# Patient Record
Sex: FEMALE | Race: WHITE | Employment: OTHER | URBAN - METROPOLITAN AREA
[De-identification: names, ages, dates, MRNs, and addresses within clinical notes are randomized per-mention and may not be internally consistent; named-entity substitution may affect disease eponyms.]

---

## 2017-01-06 ENCOUNTER — OFFICE VISIT (OUTPATIENT)
Dept: FAMILY MEDICINE CLINIC | Facility: CLINIC | Age: 82
End: 2017-01-06

## 2017-01-06 VITALS
WEIGHT: 175 LBS | RESPIRATION RATE: 16 BRPM | SYSTOLIC BLOOD PRESSURE: 111 MMHG | HEART RATE: 61 BPM | HEIGHT: 57.48 IN | BODY MASS INDEX: 37.24 KG/M2 | TEMPERATURE: 98 F | DIASTOLIC BLOOD PRESSURE: 66 MMHG

## 2017-01-06 DIAGNOSIS — I10 ESSENTIAL HYPERTENSION WITH GOAL BLOOD PRESSURE LESS THAN 140/90: Primary | ICD-10-CM

## 2017-01-06 DIAGNOSIS — Z12.31 ENCOUNTER FOR SCREENING MAMMOGRAM FOR BREAST CANCER: ICD-10-CM

## 2017-01-06 PROCEDURE — 99213 OFFICE O/P EST LOW 20 MIN: CPT | Performed by: FAMILY MEDICINE

## 2017-01-06 PROCEDURE — G0463 HOSPITAL OUTPT CLINIC VISIT: HCPCS | Performed by: FAMILY MEDICINE

## 2017-01-06 NOTE — PROGRESS NOTES
HPI:    Patient ID: Zoë Morris is a 80year old female. Hypertension  This is a chronic problem. The current episode started more than 1 year ago. The problem is unchanged. The problem is controlled.  Pertinent negatives include no anxiety, blurred vi Skin: Skin is warm and dry.               ASSESSMENT/PLAN:   Essential hypertension with goal blood pressure less than 140/90  (primary encounter diagnosis)  Encounter for screening mammogram for breast cancer   Anxiety disorder    Hypertension–well contr

## 2017-03-02 ENCOUNTER — TELEPHONE (OUTPATIENT)
Dept: FAMILY MEDICINE CLINIC | Facility: CLINIC | Age: 82
End: 2017-03-02

## 2017-03-23 RX ORDER — MELOXICAM 15 MG/1
TABLET ORAL
Qty: 30 TABLET | Refills: 1 | Status: SHIPPED | OUTPATIENT
Start: 2017-03-23 | End: 2018-10-11 | Stop reason: ALTCHOICE

## 2017-05-07 RX ORDER — TRAMADOL HYDROCHLORIDE 50 MG/1
TABLET ORAL
Qty: 90 TABLET | Refills: 0 | Status: SHIPPED | OUTPATIENT
Start: 2017-05-07 | End: 2018-12-18 | Stop reason: ALTCHOICE

## 2017-07-25 RX ORDER — SIMVASTATIN 20 MG
TABLET ORAL
Qty: 90 TABLET | Refills: 3 | Status: SHIPPED | OUTPATIENT
Start: 2017-07-25 | End: 2018-07-28

## 2017-07-25 RX ORDER — VALSARTAN AND HYDROCHLOROTHIAZIDE 160; 12.5 MG/1; MG/1
TABLET, FILM COATED ORAL
Qty: 90 TABLET | Refills: 3 | Status: SHIPPED | OUTPATIENT
Start: 2017-07-25 | End: 2017-12-16 | Stop reason: ALTCHOICE

## 2017-09-29 ENCOUNTER — OFFICE VISIT (OUTPATIENT)
Dept: FAMILY MEDICINE CLINIC | Facility: CLINIC | Age: 82
End: 2017-09-29

## 2017-09-29 ENCOUNTER — APPOINTMENT (OUTPATIENT)
Dept: LAB | Age: 82
End: 2017-09-29
Attending: FAMILY MEDICINE
Payer: MEDICARE

## 2017-09-29 VITALS
DIASTOLIC BLOOD PRESSURE: 69 MMHG | TEMPERATURE: 98 F | WEIGHT: 173.81 LBS | RESPIRATION RATE: 14 BRPM | BODY MASS INDEX: 36.99 KG/M2 | HEART RATE: 67 BPM | HEIGHT: 57.48 IN | SYSTOLIC BLOOD PRESSURE: 106 MMHG

## 2017-09-29 DIAGNOSIS — E78.2 MIXED HYPERLIPIDEMIA: ICD-10-CM

## 2017-09-29 DIAGNOSIS — I10 HYPERTENSION, BENIGN: ICD-10-CM

## 2017-09-29 DIAGNOSIS — Z00.00 ROUTINE PHYSICAL EXAMINATION: ICD-10-CM

## 2017-09-29 DIAGNOSIS — IMO0001 CLASS 2 OBESITY WITH SERIOUS COMORBIDITY AND BODY MASS INDEX (BMI) OF 37.0 TO 37.9 IN ADULT, UNSPECIFIED OBESITY TYPE: ICD-10-CM

## 2017-09-29 DIAGNOSIS — Z00.00 ENCOUNTER FOR ANNUAL HEALTH EXAMINATION: ICD-10-CM

## 2017-09-29 DIAGNOSIS — R73.03 PREDIABETES: ICD-10-CM

## 2017-09-29 DIAGNOSIS — M85.80 OSTEOPENIA, UNSPECIFIED LOCATION: ICD-10-CM

## 2017-09-29 DIAGNOSIS — Z00.00 ROUTINE PHYSICAL EXAMINATION: Primary | ICD-10-CM

## 2017-09-29 DIAGNOSIS — Z86.010 HISTORY OF COLONIC POLYPS: ICD-10-CM

## 2017-09-29 DIAGNOSIS — I44.7 LEFT BUNDLE BRANCH BLOCK (LBBB): ICD-10-CM

## 2017-09-29 DIAGNOSIS — M15.9 PRIMARY OSTEOARTHRITIS INVOLVING MULTIPLE JOINTS: ICD-10-CM

## 2017-09-29 DIAGNOSIS — H81.09 MENIERE'S DISEASE, UNSPECIFIED LATERALITY: ICD-10-CM

## 2017-09-29 DIAGNOSIS — R73.9 HYPERGLYCEMIA: ICD-10-CM

## 2017-09-29 DIAGNOSIS — E28.39 ESTROGEN DEFICIENCY: ICD-10-CM

## 2017-09-29 PROBLEM — H91.13 PRESBYCUSIS OF BOTH EARS: Status: ACTIVE | Noted: 2017-09-29

## 2017-09-29 PROCEDURE — 82306 VITAMIN D 25 HYDROXY: CPT

## 2017-09-29 PROCEDURE — 80053 COMPREHEN METABOLIC PANEL: CPT

## 2017-09-29 PROCEDURE — 90653 IIV ADJUVANT VACCINE IM: CPT | Performed by: FAMILY MEDICINE

## 2017-09-29 PROCEDURE — 84443 ASSAY THYROID STIM HORMONE: CPT

## 2017-09-29 PROCEDURE — G0009 ADMIN PNEUMOCOCCAL VACCINE: HCPCS | Performed by: FAMILY MEDICINE

## 2017-09-29 PROCEDURE — G0439 PPPS, SUBSEQ VISIT: HCPCS | Performed by: FAMILY MEDICINE

## 2017-09-29 PROCEDURE — 83036 HEMOGLOBIN GLYCOSYLATED A1C: CPT

## 2017-09-29 PROCEDURE — 90670 PCV13 VACCINE IM: CPT | Performed by: FAMILY MEDICINE

## 2017-09-29 PROCEDURE — 80061 LIPID PANEL: CPT

## 2017-09-29 PROCEDURE — G0008 ADMIN INFLUENZA VIRUS VAC: HCPCS | Performed by: FAMILY MEDICINE

## 2017-09-29 NOTE — PROGRESS NOTES
HPI:   Gurwinder Kim is a [de-identified]year old female who presents for a Medicare Subsequent Annual Wellness visit (Pt already had Initial Annual Wellness).     generally well  Her last annual assessment has been over 1 year: Annual Physical due on 04/10/1939 hypertension. She  has no past surgical history on file. Her family history includes Other in her father and mother. SOCIAL HISTORY:   She  reports that she has never smoked.  She has never used smokeless tobacco. She reports that she does not drink some words in a sentence and need to ask people to repeat themselves:  Yes   I especially have trouble understanding the speech of women and children:  Sometimes I have trouble understanding the speaker in a large room such as at a meeting or place of wors normal, no rashes or lesions   Lymph nodes: Cervical, supraclavicular, and axillary nodes normal   Neurologic: Normal         SUGGESTED VACCINATIONS - Influenza, Pneumococcal, Zoster, Tetanus     Immunization History   Administered Date(s) Administered   • Nonfasting labs today.     Estrogen deficiency–check BMD, osteopenia in the past    Hypertension–blood pressure controlled with valsartan/HCT    Mixed hyperlipidemia– continue simvastatin    Ménière's disease–controlled with beta histamine    Nonmorbid obes current health state?: Fair    How do you maintain positive mental well-being?: Social Interaction    If you are a male age 38-65 or a female age 47-67, do you take aspirin?: No    Have you had any immunizations at another office such as Influenza, Hepatit here.  Cognitive Assessment     What day of the week is this?: Correct    What month is it?: Correct    What year is it?: Correct    Recall \"Ball\": Correct    Recall \"Flag\": Correct    Recall \"Tree\": Correct       This section provided for quick revi Immunizations (Update Immunization Activity if applicable)     Influenza  Covered Annually 10/12/2016 Please get every year    Pneumococcal 15 (Prevnar)  Covered Once after 65 09/29/2017 Please get once after your 65th birthday    Pneumococcal 23 (Pneu found.    COPD      Spirometry Testing Annually Spirometry date:  No flowsheet data found.          Template: OMAR LAWRENCE MEDICARE ANNUAL ASSESSMENT FEMALE [09642]

## 2017-09-29 NOTE — PROGRESS NOTES
HPI:    Patient ID: Marcelino Giron is a [de-identified]year old female. HPI    Review of Systems         Current Outpatient Prescriptions:  aspirin 81 MG Oral Tab Take 1 tablet (81 mg total) by mouth daily.  Disp: 90 tablet Rfl: 3   VALSARTAN-HYDROCHLOROTHIAZIDE 160- multiple joints–primarily her knees, shoulders. Continuing with meloxicam and as needed tramadol.   May consider returning for steroid injection knees    History of colonic polyps– 2015, consider repeat colonoscopy 2020    Prediabetes–discussed importance

## 2017-09-29 NOTE — PATIENT INSTRUCTIONS
Kalani Reyes's SCREENING SCHEDULE   Tests on this list are recommended by your physician but may not be covered, or covered at this frequency, by your insurer. Please check with your insurance carrier before scheduling to verify coverage.    PREVENTATIVE Colonoscopy Screen   Covered every 10 years- more often if abnormal There are no preventive care reminders to display for this patient.  Update Health Maintenance if applicable    Flex Sigmoidoscopy Screen  Covered every 5 years No results found for Graeme Kanner year    Pneumococcal 13 (Prevnar)  Covered Once after 65   Orders placed or performed in visit on 09/29/17  -PNEUMOCOCCAL VACC, 13 RENETTA IM    Please get once after your 65th birthday    Pneumococcal 23 (Pneumovax)  Covered Once after 65 No orders found for

## 2017-11-20 ENCOUNTER — TELEPHONE (OUTPATIENT)
Dept: FAMILY MEDICINE CLINIC | Facility: CLINIC | Age: 82
End: 2017-11-20

## 2017-12-16 ENCOUNTER — APPOINTMENT (OUTPATIENT)
Dept: CT IMAGING | Facility: HOSPITAL | Age: 82
DRG: 153 | End: 2017-12-16
Attending: EMERGENCY MEDICINE
Payer: MEDICARE

## 2017-12-16 ENCOUNTER — OFFICE VISIT (OUTPATIENT)
Dept: FAMILY MEDICINE CLINIC | Facility: CLINIC | Age: 82
End: 2017-12-16

## 2017-12-16 ENCOUNTER — NURSE TRIAGE (OUTPATIENT)
Dept: FAMILY MEDICINE CLINIC | Facility: CLINIC | Age: 82
End: 2017-12-16

## 2017-12-16 ENCOUNTER — HOSPITAL ENCOUNTER (INPATIENT)
Facility: HOSPITAL | Age: 82
LOS: 1 days | Discharge: HOME | DRG: 153 | End: 2017-12-17
Attending: EMERGENCY MEDICINE | Admitting: INTERNAL MEDICINE
Payer: MEDICARE

## 2017-12-16 VITALS
HEIGHT: 57.48 IN | BODY MASS INDEX: 36.47 KG/M2 | SYSTOLIC BLOOD PRESSURE: 126 MMHG | TEMPERATURE: 98 F | RESPIRATION RATE: 18 BRPM | DIASTOLIC BLOOD PRESSURE: 73 MMHG | HEART RATE: 94 BPM | WEIGHT: 171.38 LBS

## 2017-12-16 DIAGNOSIS — J36 PERITONSILLAR ABSCESS: Primary | ICD-10-CM

## 2017-12-16 PROCEDURE — 70491 CT SOFT TISSUE NECK W/DYE: CPT | Performed by: EMERGENCY MEDICINE

## 2017-12-16 PROCEDURE — G0463 HOSPITAL OUTPT CLINIC VISIT: HCPCS | Performed by: FAMILY MEDICINE

## 2017-12-16 PROCEDURE — 99223 1ST HOSP IP/OBS HIGH 75: CPT | Performed by: INTERNAL MEDICINE

## 2017-12-16 PROCEDURE — 87880 STREP A ASSAY W/OPTIC: CPT | Performed by: FAMILY MEDICINE

## 2017-12-16 PROCEDURE — 99213 OFFICE O/P EST LOW 20 MIN: CPT | Performed by: FAMILY MEDICINE

## 2017-12-16 RX ORDER — SODIUM CHLORIDE 0.9 % (FLUSH) 0.9 %
3 SYRINGE (ML) INJECTION AS NEEDED
Status: DISCONTINUED | OUTPATIENT
Start: 2017-12-16 | End: 2017-12-17

## 2017-12-16 RX ORDER — HEPARIN SODIUM 5000 [USP'U]/ML
5000 INJECTION, SOLUTION INTRAVENOUS; SUBCUTANEOUS EVERY 8 HOURS SCHEDULED
Status: DISCONTINUED | OUTPATIENT
Start: 2017-12-16 | End: 2017-12-17

## 2017-12-16 RX ORDER — MORPHINE SULFATE 4 MG/ML
4 INJECTION, SOLUTION INTRAMUSCULAR; INTRAVENOUS EVERY 2 HOUR PRN
Status: DISCONTINUED | OUTPATIENT
Start: 2017-12-16 | End: 2017-12-17

## 2017-12-16 RX ORDER — MORPHINE SULFATE 2 MG/ML
1 INJECTION, SOLUTION INTRAMUSCULAR; INTRAVENOUS EVERY 2 HOUR PRN
Status: DISCONTINUED | OUTPATIENT
Start: 2017-12-16 | End: 2017-12-17

## 2017-12-16 RX ORDER — IBUPROFEN 400 MG/1
400 TABLET ORAL EVERY 4 HOURS PRN
Status: DISCONTINUED | OUTPATIENT
Start: 2017-12-16 | End: 2017-12-17

## 2017-12-16 RX ORDER — DEXAMETHASONE SODIUM PHOSPHATE 4 MG/ML
6 VIAL (ML) INJECTION ONCE
Status: COMPLETED | OUTPATIENT
Start: 2017-12-16 | End: 2017-12-16

## 2017-12-16 RX ORDER — IBUPROFEN 200 MG
200 TABLET ORAL EVERY 4 HOURS PRN
Status: DISCONTINUED | OUTPATIENT
Start: 2017-12-16 | End: 2017-12-17

## 2017-12-16 RX ORDER — VALSARTAN 320 MG/1
160 TABLET ORAL DAILY
Status: DISCONTINUED | OUTPATIENT
Start: 2017-12-16 | End: 2017-12-17

## 2017-12-16 RX ORDER — ONDANSETRON 2 MG/ML
4 INJECTION INTRAMUSCULAR; INTRAVENOUS EVERY 6 HOURS PRN
Status: DISCONTINUED | OUTPATIENT
Start: 2017-12-16 | End: 2017-12-17

## 2017-12-16 RX ORDER — SODIUM CHLORIDE 9 MG/ML
INJECTION, SOLUTION INTRAVENOUS CONTINUOUS
Status: DISCONTINUED | OUTPATIENT
Start: 2017-12-16 | End: 2017-12-17

## 2017-12-16 RX ORDER — MORPHINE SULFATE 2 MG/ML
2 INJECTION, SOLUTION INTRAMUSCULAR; INTRAVENOUS EVERY 2 HOUR PRN
Status: DISCONTINUED | OUTPATIENT
Start: 2017-12-16 | End: 2017-12-17

## 2017-12-16 RX ORDER — VALSARTAN 160 MG/1
160 TABLET ORAL DAILY
COMMUNITY
End: 2018-10-11 | Stop reason: ALTCHOICE

## 2017-12-16 RX ORDER — IBUPROFEN 600 MG/1
600 TABLET ORAL EVERY 4 HOURS PRN
Status: DISCONTINUED | OUTPATIENT
Start: 2017-12-16 | End: 2017-12-17

## 2017-12-16 NOTE — TELEPHONE ENCOUNTER
Spoke with pt and Dr Chichi Jeffries recommendations given. Pt states she can come in today at [phone]. Phone room to assist with opening up [phone] slot on Dr Pierre's schedule.

## 2017-12-16 NOTE — ED PROVIDER NOTES
Patient Seen in: [facility] AND [facility] Emergency Department    History   Patient presents with:  Sore Throat    Stated Complaint: sore throat and left ear pain    HPI    Patient is an 81-year-old female that states about 3 weeks ago she started to have a sor exudate. There is no uvular shift. There seems to be some swelling in front of the left tonsillar arch. Eyes: Conjunctivae and EOM are normal. Pupils are equal, round, and reactive to light. Neck: Neck supple.    Cardiovascular: Normal rate, regular rh Tissue Of Neck(contrast Only) (cpt=70491)    Result Date: 12/16/2017  CONCLUSION:  1.  Well-defined rounded 1.7 x 1.6 x 1.5 cm size peripherally enhancing region in the left parapharyngeal location with moderate surrounding edema and moderate effacement of

## 2017-12-16 NOTE — TELEPHONE ENCOUNTER
Please see if she can take 2 extra strength Tylenol and feel up to coming in at 12:45 PM appointment with me.   If not, I will send an antibiotic

## 2017-12-16 NOTE — H&P
[facility] Patient Status:  Emergency    4/10/1935 MRN [id number]   Location [address] Attending Pawan Minor MD   Hosp Day # 0 PCP Yanet Puri.  Kaylynn Jama MD     Date:   Cholecalciferol (VITAMIN D) 2000 units Oral Tab   No No   Sig: Take 2,000 Int'l Units by mouth daily. MELOXICAM 15 MG Oral Tab   No No   Sig: TAKE 1 TABLET (15 MG TOTAL) BY MOUTH DAILY.    SIMVASTATIN 20 MG Oral Tab   No No   Sig: TAKE 1 TABLET EVERY NI nerves II-XII are grossly intact. Cognition and Speech:  Oriented, speech clear and coherent. Psychiatric:  Cooperative, appropriate mood & affect.       Laboratory Data:     Lab Results  Component Value Date   WBC 11.8 12/16/2017   HGB 14.6 12/16/2017    BONES: Moderately severe cervical spondylosis. Moderate cervical thoracic scoliosis.   OTHER: Normal.  No additional imaging findings.       Dictated by (CST): Marietta Sheth MD on 12/16/2017 at 15:41       Approved by (CST): Marietta Sheth MD on 12/16/2

## 2017-12-16 NOTE — PROGRESS NOTES
HPI:    Patient ID: Ralph Hoff is a [de-identified]year old female. Sore Throat    This is a new problem. The current episode started 1 to 4 weeks ago. The problem has been rapidly worsening. The pain is worse on the left side. The pain is severe.  Associated sym Skin: Skin is warm and dry. ASSESSMENT/PLAN:   Peritonsillar abscess  (primary encounter diagnosis)     Patient with sore throat 1 week ago started to improve but then the past 3 days increasing pain and change in voice.   On exam left soft p

## 2017-12-17 VITALS
RESPIRATION RATE: 18 BRPM | HEIGHT: 60 IN | DIASTOLIC BLOOD PRESSURE: 53 MMHG | TEMPERATURE: 98 F | SYSTOLIC BLOOD PRESSURE: 127 MMHG | BODY MASS INDEX: 33.77 KG/M2 | OXYGEN SATURATION: 96 % | WEIGHT: 172 LBS | HEART RATE: 71 BPM

## 2017-12-17 PROCEDURE — 99239 HOSP IP/OBS DSCHRG MGMT >30: CPT | Performed by: HOSPITALIST

## 2017-12-17 RX ORDER — AMOXICILLIN AND CLAVULANATE POTASSIUM 875; 125 MG/1; MG/1
1 TABLET, FILM COATED ORAL 2 TIMES DAILY
Qty: 20 TABLET | Refills: 0 | Status: SHIPPED | OUTPATIENT
Start: 2017-12-17 | End: 2017-12-27

## 2017-12-17 RX ORDER — DEXAMETHASONE 4 MG/1
TABLET ORAL
Qty: 3 TABLET | Refills: 0 | Status: SHIPPED | OUTPATIENT
Start: 2017-12-17 | End: 2018-10-11 | Stop reason: ALTCHOICE

## 2017-12-17 NOTE — PROGRESS NOTES
Pt discharged, son provided transportation home. Discharge paperwork and prescriptions reviewed, pt. verbalized understanding. All questions and concerns addressed. IV access discontinued.

## 2017-12-17 NOTE — PLAN OF CARE
Problem: Patient/Family Goals  Goal: Patient/Family Long Term Goal  Patient's Long Term Goal: return home  Interventions:  - medical intervention  - See additional Care Plan goals for specific interventions    Outcome: Progressing    Goal: Patient/Family S

## 2017-12-17 NOTE — PLAN OF CARE
Problem: Patient/Family Goals  Goal: Patient/Family Long Term Goal  Patient's Long Term Goal: return home  Interventions:  - medical intervention  - See additional Care Plan goals for specific interventions   Outcome: Progressing    Goal: Patient/Family Sh

## 2017-12-17 NOTE — PROGRESS NOTES
[facility] Pharmacy Note:  Renal Adjustment for Unasyn (ampicillin/sulbactam)    Cheng Brewer is a [de-identified]year old female who has been prescribed Unasyn (ampicillin/sulbactam) 3000 mg every 8 hrs.   CrCl is estimated creatinine clearance is 31.6 mL/min (based on SCr

## 2017-12-18 ENCOUNTER — TELEPHONE (OUTPATIENT)
Dept: INTERNAL MEDICINE UNIT | Facility: HOSPITAL | Age: 82
End: 2017-12-18

## 2017-12-18 ENCOUNTER — OFFICE VISIT (OUTPATIENT)
Dept: OTOLARYNGOLOGY | Facility: CLINIC | Age: 82
End: 2017-12-18

## 2017-12-18 VITALS
SYSTOLIC BLOOD PRESSURE: 112 MMHG | WEIGHT: 170 LBS | TEMPERATURE: 97 F | HEIGHT: 59 IN | DIASTOLIC BLOOD PRESSURE: 60 MMHG | BODY MASS INDEX: 34.27 KG/M2

## 2017-12-18 DIAGNOSIS — J03.90 TONSILLITIS: Primary | ICD-10-CM

## 2017-12-18 PROCEDURE — G0463 HOSPITAL OUTPT CLINIC VISIT: HCPCS | Performed by: OTOLARYNGOLOGY

## 2017-12-18 PROCEDURE — 99203 OFFICE O/P NEW LOW 30 MIN: CPT | Performed by: OTOLARYNGOLOGY

## 2017-12-18 NOTE — PROGRESS NOTES
Yolanda Esqueda is a [de-identified]year old female. Patient presents with:  Sore Throat: patient reports sore throat for 3 days ent to ER, peritonsillar abcess on 12/16/17      HISTORY OF PRESENT ILLNESS  12/18/2017  Patient prevents for evaluation of sore throats.  Shari Patel depression. Integumentary Negative Frequent skin infections, pigment change and rash. Hema/Lymph Negative Easy bleeding and easy bruising.            PHYSICAL EXAM    /60 (BP Location: Left arm, Patient Position: Sitting, Cuff Size: adult)   Temp Cholecalciferol (VITAMIN D) 2000 units Oral Tab, Take 2,000 Int'l Units by mouth daily. , Disp: 90 tablet, Rfl: 3  •  SIMVASTATIN 20 MG Oral Tab, TAKE 1 TABLET EVERY NIGHT, Disp: 90 tablet, Rfl: 3  •  TRAMADOL HCL 50 MG Oral Tab, TAKE 1 TABLET 3 TIMES A DAY

## 2017-12-18 NOTE — DISCHARGE SUMMARY
[place] [facility]    Discharge Summary    Marija Urena Patient Status:  Inpatient    4/10/1935 MRN [id number]   Location [facility] 5SW/SE Attending No att. providers found   Hosp Day # 1 PCP Jazz Shane MD     Date of Admission: treated as below:    Parapharyngeal abscess  Leukocytosis  CT confirmed 1.6 x 1.7 x 1.5 peripherally enhancing region in the left parapharyngeal location compatible with a left parapharyngeal abscess  ENT consulted  Pt given decadron and IV unasyn.   Pt sx STOP taking these medications    aspirin 81 MG Tabs              Where to Get Your Medications      Please  your prescriptions at the location directed by your doctor or nurse    Bring a paper prescription for each of these medications  · Amoxi

## 2017-12-18 NOTE — TELEPHONE ENCOUNTER
Pt returning phone call. Pt states she saw the Otolaryngologist today and she feels great. Pt states that she \"loves everyone at the clinic for making her feel better\". Pt states that if she has to come in for a follow up, just let her know.  Please advis

## 2018-06-02 ENCOUNTER — NURSE TRIAGE (OUTPATIENT)
Dept: OTHER | Age: 83
End: 2018-06-02

## 2018-06-02 RX ORDER — AMOXICILLIN AND CLAVULANATE POTASSIUM 875; 125 MG/1; MG/1
TABLET, FILM COATED ORAL
Qty: 20 TABLET | Refills: 0 | OUTPATIENT
Start: 2018-06-02

## 2018-06-02 NOTE — TELEPHONE ENCOUNTER
Action Requested: Summary for Provider     []  Critical Lab, Recommendations Needed  [] Need Additional Advice  []   FYI    []   Need Orders  [] Need Medications Sent to Pharmacy  []  Other     SUMMARY: pt with c/o mild sore throat for one week, rates one

## 2018-06-02 NOTE — TELEPHONE ENCOUNTER
Spoke with pt and advised of Dr Cabello Points recommendations. Pt states she will see how she is feeling later and if worse she will go to UC either today or tomorrow. If pt not feeling worse she will keep her OV for 6/4/18.

## 2018-06-04 ENCOUNTER — OFFICE VISIT (OUTPATIENT)
Dept: FAMILY MEDICINE CLINIC | Facility: CLINIC | Age: 83
End: 2018-06-04

## 2018-06-04 VITALS
BODY MASS INDEX: 35.44 KG/M2 | HEART RATE: 62 BPM | SYSTOLIC BLOOD PRESSURE: 136 MMHG | TEMPERATURE: 98 F | HEIGHT: 59 IN | WEIGHT: 175.81 LBS | DIASTOLIC BLOOD PRESSURE: 76 MMHG

## 2018-06-04 DIAGNOSIS — J02.9 PHARYNGITIS, UNSPECIFIED ETIOLOGY: Primary | ICD-10-CM

## 2018-06-04 PROCEDURE — 87880 STREP A ASSAY W/OPTIC: CPT | Performed by: FAMILY MEDICINE

## 2018-06-04 PROCEDURE — 99213 OFFICE O/P EST LOW 20 MIN: CPT | Performed by: FAMILY MEDICINE

## 2018-06-04 PROCEDURE — G0463 HOSPITAL OUTPT CLINIC VISIT: HCPCS | Performed by: FAMILY MEDICINE

## 2018-06-04 RX ORDER — AMOXICILLIN AND CLAVULANATE POTASSIUM 875; 125 MG/1; MG/1
1 TABLET, FILM COATED ORAL 2 TIMES DAILY
Qty: 20 TABLET | Refills: 0 | Status: SHIPPED | OUTPATIENT
Start: 2018-06-04 | End: 2018-06-14

## 2018-06-04 NOTE — PROGRESS NOTES
HPI:    Patient ID: Jesus Alonzo is a 82year old female. See below        Review of Systems         Current Outpatient Prescriptions:  Amoxicillin-Pot Clavulanate 875-125 MG Oral Tab Take 1 tablet by mouth 2 (two) times daily.  Disp: 20 tablet Rfl: 0 hospitalized with peritonsillar abscess abscess. On exam today uvula midline, tonsils normal.  Mild pharyngeal erythema. With history of recent abscess recommend Augmentin as directed pending results of culture.   Reviewed instructions and side effects of

## 2018-07-29 RX ORDER — VALSARTAN AND HYDROCHLOROTHIAZIDE 160; 12.5 MG/1; MG/1
TABLET, FILM COATED ORAL
Qty: 90 TABLET | Refills: 3 | Status: SHIPPED | OUTPATIENT
Start: 2018-07-29 | End: 2019-07-24

## 2018-07-29 RX ORDER — SIMVASTATIN 20 MG
TABLET ORAL
Qty: 90 TABLET | Refills: 3 | Status: SHIPPED | OUTPATIENT
Start: 2018-07-29 | End: 2019-07-24

## 2018-10-11 ENCOUNTER — OFFICE VISIT (OUTPATIENT)
Dept: FAMILY MEDICINE CLINIC | Facility: CLINIC | Age: 83
End: 2018-10-11
Payer: MEDICARE

## 2018-10-11 ENCOUNTER — TELEPHONE (OUTPATIENT)
Dept: FAMILY MEDICINE CLINIC | Facility: CLINIC | Age: 83
End: 2018-10-11

## 2018-10-11 VITALS
HEART RATE: 66 BPM | TEMPERATURE: 98 F | WEIGHT: 170.38 LBS | SYSTOLIC BLOOD PRESSURE: 116 MMHG | DIASTOLIC BLOOD PRESSURE: 80 MMHG | RESPIRATION RATE: 18 BRPM | BODY MASS INDEX: 35.76 KG/M2 | HEIGHT: 58 IN

## 2018-10-11 DIAGNOSIS — F41.1 GENERALIZED ANXIETY DISORDER: ICD-10-CM

## 2018-10-11 DIAGNOSIS — I10 HYPERTENSION, BENIGN: Primary | ICD-10-CM

## 2018-10-11 PROCEDURE — 99213 OFFICE O/P EST LOW 20 MIN: CPT | Performed by: FAMILY MEDICINE

## 2018-10-11 PROCEDURE — G0008 ADMIN INFLUENZA VIRUS VAC: HCPCS | Performed by: FAMILY MEDICINE

## 2018-10-11 PROCEDURE — 90653 IIV ADJUVANT VACCINE IM: CPT | Performed by: FAMILY MEDICINE

## 2018-10-11 PROCEDURE — G0463 HOSPITAL OUTPT CLINIC VISIT: HCPCS | Performed by: FAMILY MEDICINE

## 2018-10-11 NOTE — TELEPHONE ENCOUNTER
Left message on pt's phone machine to call the OPO and schedule her medicare PE with Dr Jas Brooks for sometime in Dec 2018 pt is also going to have fasting bloodwork on that same visit, so please schedule the appt for am, and no eating just water on that day

## 2018-10-11 NOTE — PROGRESS NOTES
HPI:    Patient ID: Marija Urena is a 82year old female. HTN   This is a chronic problem. The current episode started more than 1 year ago. The problem is unchanged. The problem is controlled. Associated symptoms include anxiety.  Pertinent negatives i yesterday, he prescribed Astelin for chronic intermittent congestion. Generalized anxiety disorder–increased stress recently with her son Keira Nunes hospitalized for mental health issues. He is , .   She states he is always had anxiety prob

## 2018-10-15 RX ORDER — ASPIRIN 81 MG/1
TABLET ORAL
Qty: 90 TABLET | Refills: 3 | Status: SHIPPED | OUTPATIENT
Start: 2018-10-15 | End: 2019-12-18

## 2018-12-18 ENCOUNTER — OFFICE VISIT (OUTPATIENT)
Dept: FAMILY MEDICINE CLINIC | Facility: CLINIC | Age: 83
End: 2018-12-18
Payer: MEDICARE

## 2018-12-18 ENCOUNTER — APPOINTMENT (OUTPATIENT)
Dept: LAB | Age: 83
End: 2018-12-18
Attending: FAMILY MEDICINE
Payer: MEDICARE

## 2018-12-18 VITALS
RESPIRATION RATE: 18 BRPM | HEIGHT: 58 IN | HEART RATE: 55 BPM | WEIGHT: 171 LBS | BODY MASS INDEX: 35.89 KG/M2 | SYSTOLIC BLOOD PRESSURE: 134 MMHG | DIASTOLIC BLOOD PRESSURE: 75 MMHG | TEMPERATURE: 98 F

## 2018-12-18 DIAGNOSIS — I10 HYPERTENSION, BENIGN: ICD-10-CM

## 2018-12-18 DIAGNOSIS — M15.9 PRIMARY OSTEOARTHRITIS INVOLVING MULTIPLE JOINTS: ICD-10-CM

## 2018-12-18 DIAGNOSIS — I44.7 LEFT BUNDLE BRANCH BLOCK (LBBB): ICD-10-CM

## 2018-12-18 DIAGNOSIS — E66.01 CLASS 2 SEVERE OBESITY WITH SERIOUS COMORBIDITY AND BODY MASS INDEX (BMI) OF 35.0 TO 35.9 IN ADULT, UNSPECIFIED OBESITY TYPE (HCC): ICD-10-CM

## 2018-12-18 DIAGNOSIS — R73.03 PREDIABETES: ICD-10-CM

## 2018-12-18 DIAGNOSIS — E78.2 MIXED HYPERLIPIDEMIA: ICD-10-CM

## 2018-12-18 DIAGNOSIS — Z00.00 ROUTINE PHYSICAL EXAMINATION: Primary | ICD-10-CM

## 2018-12-18 DIAGNOSIS — Z12.31 ENCOUNTER FOR SCREENING MAMMOGRAM FOR BREAST CANCER: ICD-10-CM

## 2018-12-18 DIAGNOSIS — Z00.00 ENCOUNTER FOR ANNUAL HEALTH EXAMINATION: ICD-10-CM

## 2018-12-18 DIAGNOSIS — M85.80 OSTEOPENIA, UNSPECIFIED LOCATION: ICD-10-CM

## 2018-12-18 DIAGNOSIS — H91.13 PRESBYCUSIS OF BOTH EARS: ICD-10-CM

## 2018-12-18 DIAGNOSIS — H81.09 MENIERE'S DISEASE, UNSPECIFIED LATERALITY: ICD-10-CM

## 2018-12-18 DIAGNOSIS — Z86.010 HISTORY OF COLONIC POLYPS: ICD-10-CM

## 2018-12-18 PROBLEM — J36 PERITONSILLAR ABSCESS: Status: RESOLVED | Noted: 2017-12-16 | Resolved: 2018-12-18

## 2018-12-18 PROCEDURE — 80053 COMPREHEN METABOLIC PANEL: CPT

## 2018-12-18 PROCEDURE — 36415 COLL VENOUS BLD VENIPUNCTURE: CPT

## 2018-12-18 PROCEDURE — 80061 LIPID PANEL: CPT

## 2018-12-18 PROCEDURE — 84443 ASSAY THYROID STIM HORMONE: CPT

## 2018-12-18 PROCEDURE — 83036 HEMOGLOBIN GLYCOSYLATED A1C: CPT

## 2018-12-18 PROCEDURE — G0439 PPPS, SUBSEQ VISIT: HCPCS | Performed by: FAMILY MEDICINE

## 2018-12-18 RX ORDER — TURMERIC 400 MG
1 CAPSULE ORAL 3 TIMES DAILY
Qty: 90 CAPSULE | Refills: 0 | COMMUNITY
Start: 2018-12-18

## 2018-12-18 NOTE — PATIENT INSTRUCTIONS
Kalani Reyes's SCREENING SCHEDULE   Tests on this list are recommended by your physician but may not be covered, or covered at this frequency, by your insurer. Please check with your insurance carrier before scheduling to verify coverage.    PREVENTATIVE 73-68 years old and have smoked more than 100 cigarettes in their lifetime   • Anyone with a family history    Colorectal Cancer Screening  Covered up to Age 76     Colonoscopy Screen   Covered every 10 years- more often if abnormal There are no preventive Influenza  Covered Annually Orders placed or performed in visit on 10/12/16   • FLU VACC PRSV FREE INC ANTIG   Orders placed or performed in visit on 11/30/15   • FLU VACC PRSV FREE INC ANTIG    Please get every year    Pneumococcal 13 (Prevnar)  Covered O information from the [address] [place] [address] regarding Advance Directives.

## 2018-12-18 NOTE — PROGRESS NOTES
HPI:   Ralph Hoff is a 82year old female who presents for a Medicare Subsequent Annual Wellness visit (Pt already had Initial Annual Wellness).     Generally well  Her last annual assessment has been over 1 year: Annual Physical due on 09/29/2018 Epic.           She has never smoked tobacco.    CAGE Alcohol screening   Porfirio Crawford was screened for Alcohol abuse and had a score of 0 so is at low risk.     Patient Care Team: Patient Care Team:  Ester Allan MD as PCP - General (Family Medicine) unspecified, Osteoarthritis, Osteopenia (2002, 2006, 2012), Osteoporosis (1999), and Unspecified essential hypertension. She  has no past surgical history on file. Her family history includes Other in her father and mother.    SOCIAL HISTORY:   She  r because I cannot hear well and fear I will reply improperly:  No   Family members and friends have told me they think I may have hearing loss:  Sometimes              Visual Acuity  Right Eye Visual Acuity: Corrected Right Eye Chart Acuity: 20/30   Left Ey (Zostavax) 03/12/2008        ASSESSMENT AND OTHER RELEVANT CHRONIC CONDITIONS:   Jesus Alonzo is a 82year old female who presents for a Medicare Assessment.      PLAN SUMMARY:   Diagnoses and all orders for this visit:    Routine physical examination    E II severe obesity–diet and exercise recommendations for weight loss discussed. Prediabetes– diet and exercise recommendations discussed. Check hemoglobin A1c today.     Diet assessment: good     PLAN:  The patient indicates understanding of these issues Annually: Diabetics, FHx Glaucoma, AA>50, > 65 No flowsheet data found. Bone Density Screening      Dexascan Every two years No results found for this or any previous visit. No flowsheet data found.     Pap and Pelvic      Pap: Every 3 yrs age 24 Potassium (mmol/L)   Date Value   12/17/2017 4.2     POTASSIUM (P) (mmol/L)   Date Value   07/05/2016 3.8    No flowsheet data found.     Creatinine  Annually Creatinine (mg/dL)   Date Value   12/17/2017 0.91     CREATININE (P) (mg/dL)   Date Value   07/05/ Imaging & Referrals:  None       #[id number]

## 2019-02-04 ENCOUNTER — TELEPHONE (OUTPATIENT)
Dept: OTHER | Age: 84
End: 2019-02-04

## 2019-02-04 NOTE — TELEPHONE ENCOUNTER
Dr. Peyton Larson received mail from [facility] stating patient's VALSARTAN-HYDROCHLOROTHIAZIDE 160-12.5 MG Oral Tab may be part of the recall. Called patient per Dr. Peyton Larson to see if patient received any notification regarding medication.      Spoke to patient and s

## 2019-07-25 RX ORDER — VALSARTAN AND HYDROCHLOROTHIAZIDE 160; 12.5 MG/1; MG/1
TABLET, FILM COATED ORAL
Qty: 90 TABLET | Refills: 3 | Status: SHIPPED | OUTPATIENT
Start: 2019-07-25 | End: 2019-10-08

## 2019-07-25 RX ORDER — SIMVASTATIN 20 MG
TABLET ORAL
Qty: 90 TABLET | Refills: 3 | Status: SHIPPED | OUTPATIENT
Start: 2019-07-25 | End: 2020-06-30

## 2019-09-16 ENCOUNTER — TELEPHONE (OUTPATIENT)
Dept: FAMILY MEDICINE CLINIC | Facility: CLINIC | Age: 84
End: 2019-09-16

## 2019-09-16 NOTE — TELEPHONE ENCOUNTER
Pt is asking if  would like to  Ralf ice from her store, or would she like it to be delivered at office or at her home?

## 2019-10-08 ENCOUNTER — OFFICE VISIT (OUTPATIENT)
Dept: FAMILY MEDICINE CLINIC | Facility: CLINIC | Age: 84
End: 2019-10-08
Payer: MEDICARE

## 2019-10-08 ENCOUNTER — APPOINTMENT (OUTPATIENT)
Dept: LAB | Age: 84
End: 2019-10-08
Attending: FAMILY MEDICINE
Payer: MEDICARE

## 2019-10-08 VITALS
BODY MASS INDEX: 36 KG/M2 | SYSTOLIC BLOOD PRESSURE: 132 MMHG | DIASTOLIC BLOOD PRESSURE: 81 MMHG | WEIGHT: 174.19 LBS | RESPIRATION RATE: 18 BRPM | TEMPERATURE: 98 F | HEART RATE: 56 BPM

## 2019-10-08 DIAGNOSIS — R73.03 PREDIABETES: ICD-10-CM

## 2019-10-08 DIAGNOSIS — I10 HYPERTENSION, BENIGN: ICD-10-CM

## 2019-10-08 DIAGNOSIS — I10 HYPERTENSION, BENIGN: Primary | ICD-10-CM

## 2019-10-08 DIAGNOSIS — H81.09 MENIERE'S DISEASE, UNSPECIFIED LATERALITY: ICD-10-CM

## 2019-10-08 PROCEDURE — 80048 BASIC METABOLIC PNL TOTAL CA: CPT

## 2019-10-08 PROCEDURE — 99213 OFFICE O/P EST LOW 20 MIN: CPT | Performed by: FAMILY MEDICINE

## 2019-10-08 PROCEDURE — 80061 LIPID PANEL: CPT

## 2019-10-08 PROCEDURE — G0008 ADMIN INFLUENZA VIRUS VAC: HCPCS | Performed by: FAMILY MEDICINE

## 2019-10-08 PROCEDURE — 90662 IIV NO PRSV INCREASED AG IM: CPT | Performed by: FAMILY MEDICINE

## 2019-10-08 PROCEDURE — 36415 COLL VENOUS BLD VENIPUNCTURE: CPT

## 2019-10-08 PROCEDURE — 83036 HEMOGLOBIN GLYCOSYLATED A1C: CPT

## 2019-10-08 RX ORDER — LOSARTAN POTASSIUM AND HYDROCHLOROTHIAZIDE 25; 100 MG/1; MG/1
1 TABLET ORAL DAILY
Qty: 90 TABLET | Refills: 3 | Status: SHIPPED | OUTPATIENT
Start: 2019-10-08 | End: 2020-09-21

## 2019-10-08 NOTE — PROGRESS NOTES
HPI:    Patient ID: Lily Stevenson is a 82year old female. Hypertension   This is a chronic problem. The current episode started more than 1 year ago. The problem is unchanged. The problem is controlled. Associated symptoms include anxiety.  Pertinent ne Hypertension, benign  (primary encounter diagnosis)– overall stable. No new symptoms. Continuing on valsartan/HCT, tolerating well  Except she has noticed increased sweating since switched from losartan/HCT.   Will change back to losartan HCT and monito

## 2019-10-09 ENCOUNTER — TELEPHONE (OUTPATIENT)
Dept: INTERNAL MEDICINE CLINIC | Facility: CLINIC | Age: 84
End: 2019-10-09

## 2019-10-09 RX ORDER — HYDROCHLOROTHIAZIDE 25 MG/1
25 TABLET ORAL DAILY
Qty: 90 TABLET | Refills: 0 | Status: SHIPPED | OUTPATIENT
Start: 2019-10-09 | End: 2020-09-21

## 2019-10-09 RX ORDER — LOSARTAN POTASSIUM 100 MG/1
100 TABLET ORAL DAILY
Qty: 90 TABLET | Refills: 0 | Status: SHIPPED | OUTPATIENT
Start: 2019-10-09 | End: 2020-09-21

## 2019-10-09 NOTE — TELEPHONE ENCOUNTER
for  pls see pharmacy message below. I called pharmacy and was inform that they do have the medication in two separate scripts. I have pended the medication for your review and approve.  Pls advise    Losartan Potassium-HCTZ 100-25 MG

## 2019-12-19 RX ORDER — ASPIRIN 81 MG/1
TABLET ORAL
Qty: 90 TABLET | Refills: 3 | Status: SHIPPED | OUTPATIENT
Start: 2019-12-19 | End: 2020-12-17

## 2020-01-31 ENCOUNTER — OFFICE VISIT (OUTPATIENT)
Dept: FAMILY MEDICINE CLINIC | Facility: CLINIC | Age: 85
End: 2020-01-31
Payer: MEDICARE

## 2020-01-31 VITALS
SYSTOLIC BLOOD PRESSURE: 152 MMHG | HEART RATE: 61 BPM | TEMPERATURE: 98 F | WEIGHT: 179 LBS | DIASTOLIC BLOOD PRESSURE: 77 MMHG | HEIGHT: 59 IN | RESPIRATION RATE: 18 BRPM | BODY MASS INDEX: 36.08 KG/M2

## 2020-01-31 DIAGNOSIS — I10 ESSENTIAL HYPERTENSION: ICD-10-CM

## 2020-01-31 DIAGNOSIS — J06.9 VIRAL URI: Primary | ICD-10-CM

## 2020-01-31 PROCEDURE — 99214 OFFICE O/P EST MOD 30 MIN: CPT | Performed by: FAMILY MEDICINE

## 2020-01-31 NOTE — PROGRESS NOTES
HPI:    Eugene Hernandez is a 82year old female presents to clinic with a 4-day history of chills, body aches, and nasal congestion. Is also experiencing a clogged sensation in her ears. Initially had a sore throat, which resolved.   Reports an occasional on 1/31/2020 ) 90 tablet 0   • hydrochlorothiazide 25 MG Oral Tab Take 1 tablet (25 mg total) by mouth daily.  (Patient not taking: Reported on 1/31/2020 ) 90 tablet 0       Allergies:  No Known Allergies      ROS:   Review of Systems   All other systems re encounter.       Meds This Visit:  Requested Prescriptions      No prescriptions requested or ordered in this encounter       Imaging & Referrals:  None       1/31/2020  Kandy Schaeffer MD

## 2020-02-02 ENCOUNTER — HOSPITAL ENCOUNTER (OUTPATIENT)
Age: 85
Discharge: HOME | End: 2020-02-02
Attending: EMERGENCY MEDICINE
Payer: MEDICARE

## 2020-02-02 VITALS
TEMPERATURE: 98 F | OXYGEN SATURATION: 97 % | HEIGHT: 58 IN | HEART RATE: 64 BPM | RESPIRATION RATE: 18 BRPM | SYSTOLIC BLOOD PRESSURE: 157 MMHG | BODY MASS INDEX: 37.57 KG/M2 | WEIGHT: 179 LBS | DIASTOLIC BLOOD PRESSURE: 73 MMHG

## 2020-02-02 DIAGNOSIS — J06.9 VIRAL UPPER RESPIRATORY INFECTION: Primary | ICD-10-CM

## 2020-02-02 DIAGNOSIS — N30.90 CYSTITIS: ICD-10-CM

## 2020-02-02 LAB
#MXD IC: 0.7 X10ˆ3/UL (ref 0.1–1)
BILIRUB UR QL STRIP: NEGATIVE
CLARITY UR: CLEAR
COLOR UR: YELLOW
GLUCOSE UR STRIP-MCNC: NEGATIVE MG/DL
HCT VFR BLD AUTO: 43.7 % (ref 35–48)
HGB BLD-MCNC: 14.3 G/DL (ref 12–16)
KETONES UR STRIP-MCNC: NEGATIVE MG/DL
LYMPHOCYTES # BLD AUTO: 2.3 X10ˆ3/UL (ref 1–4)
LYMPHOCYTES NFR BLD AUTO: 24.2 %
MCH RBC QN AUTO: 28.7 PG (ref 26–34)
MCHC RBC AUTO-ENTMCNC: 32.7 G/DL (ref 31–37)
MCV RBC AUTO: 87.6 FL (ref 80–100)
MIXED CELL %: 7 %
NEUTROPHILS # BLD AUTO: 6.5 X10ˆ3/UL (ref 1.5–7.7)
NEUTROPHILS NFR BLD AUTO: 68.8 %
NITRITE UR QL STRIP: NEGATIVE
PH UR STRIP: 5.5 [PH]
POCT INFLUENZA A: NEGATIVE
POCT INFLUENZA B: NEGATIVE
PROT UR STRIP-MCNC: 30 MG/DL
RBC # BLD AUTO: 4.99 X10ˆ6/UL (ref 3.8–5.3)
SP GR UR STRIP: 1.02
UROBILINOGEN UR STRIP-ACNC: <2 MG/DL
WBC # BLD AUTO: 9.5 X10ˆ3/UL (ref 4–11)

## 2020-02-02 PROCEDURE — 99214 OFFICE O/P EST MOD 30 MIN: CPT

## 2020-02-02 PROCEDURE — 81002 URINALYSIS NONAUTO W/O SCOPE: CPT

## 2020-02-02 PROCEDURE — 87086 URINE CULTURE/COLONY COUNT: CPT | Performed by: EMERGENCY MEDICINE

## 2020-02-02 PROCEDURE — 36415 COLL VENOUS BLD VENIPUNCTURE: CPT

## 2020-02-02 PROCEDURE — 85025 COMPLETE CBC W/AUTO DIFF WBC: CPT | Performed by: EMERGENCY MEDICINE

## 2020-02-02 PROCEDURE — 87502 INFLUENZA DNA AMP PROBE: CPT | Performed by: EMERGENCY MEDICINE

## 2020-02-02 RX ORDER — SULFAMETHOXAZOLE AND TRIMETHOPRIM 800; 160 MG/1; MG/1
1 TABLET ORAL 2 TIMES DAILY
Qty: 14 TABLET | Refills: 0 | Status: SHIPPED | OUTPATIENT
Start: 2020-02-02 | End: 2020-02-09

## 2020-02-02 NOTE — ED PROVIDER NOTES
Patient Seen in: [facility]      History   Patient presents with:  Headache    Stated Complaint: shaking; headache    HPI  Patient complains of runny nose postnasal drip, shaking chills on and off for the last for 5 days (Oral)   Resp 18   Ht 147.3 cm (4' 10\")   Wt 81.2 kg   LMP  (LMP Unknown)   SpO2 97%   BMI 37.41 kg/m²         Physical Exam  Vitals signs and nursing note reviewed. Constitutional:       General: She is not in acute distress.      Appearance: She is wel Normal    Narrative: This assay is a rapid molecular in vitro test utilizing nucleic acid amplification of influenza A and B viral RNA.    CBC W AUTO DIFF   POCT CBC   URINE CULTURE, ROUTINE           MDM   Results of testing were discussed with the pat

## 2020-02-02 NOTE — ED INITIAL ASSESSMENT (HPI)
Per pt having headache and ear congestion for one week, denies any fevers or cough at this time. Pt reports nasal congestion.

## 2020-02-03 LAB
BASOPHILS # BLD AUTO: 0.03 X10(3) UL (ref 0–0.2)
BASOPHILS NFR BLD AUTO: 0.3 %
DEPRECATED RDW RBC AUTO: 42.2 FL (ref 35.1–46.3)
EOSINOPHIL # BLD AUTO: 0.05 X10(3) UL (ref 0–0.7)
EOSINOPHIL NFR BLD AUTO: 0.5 %
ERYTHROCYTE [DISTWIDTH] IN BLOOD BY AUTOMATED COUNT: 13.6 % (ref 11–15)
HCT VFR BLD AUTO: 43.5 % (ref 35–48)
HGB BLD-MCNC: 14.5 G/DL (ref 12–16)
IMM GRANULOCYTES # BLD AUTO: 0.04 X10(3) UL (ref 0–1)
IMM GRANULOCYTES NFR BLD: 0.4 %
LYMPHOCYTES # BLD AUTO: 2.14 X10(3) UL (ref 1–4)
LYMPHOCYTES NFR BLD AUTO: 23 %
MCH RBC QN AUTO: 28.9 PG (ref 26–34)
MCHC RBC AUTO-ENTMCNC: 33.3 G/DL (ref 31–37)
MCV RBC AUTO: 86.7 FL (ref 80–100)
MONOCYTES # BLD AUTO: 0.55 X10(3) UL (ref 0.1–1)
MONOCYTES NFR BLD AUTO: 5.9 %
NEUTROPHILS # BLD AUTO: 6.49 X10 (3) UL (ref 1.5–7.7)
NEUTROPHILS # BLD AUTO: 6.49 X10(3) UL (ref 1.5–7.7)
NEUTROPHILS NFR BLD AUTO: 69.9 %
PLATELET # BLD AUTO: 271 10(3)UL (ref 150–450)
RBC # BLD AUTO: 5.02 X10(6)UL (ref 3.8–5.3)
WBC # BLD AUTO: 9.3 X10(3) UL (ref 4–11)

## 2020-02-10 ENCOUNTER — OFFICE VISIT (OUTPATIENT)
Dept: FAMILY MEDICINE CLINIC | Facility: CLINIC | Age: 85
End: 2020-02-10
Payer: MEDICARE

## 2020-02-10 VITALS
TEMPERATURE: 97 F | SYSTOLIC BLOOD PRESSURE: 139 MMHG | HEART RATE: 58 BPM | WEIGHT: 177.19 LBS | RESPIRATION RATE: 18 BRPM | BODY MASS INDEX: 37 KG/M2 | DIASTOLIC BLOOD PRESSURE: 74 MMHG

## 2020-02-10 DIAGNOSIS — J06.9 VIRAL URI: ICD-10-CM

## 2020-02-10 DIAGNOSIS — I10 ESSENTIAL HYPERTENSION: Primary | ICD-10-CM

## 2020-02-10 DIAGNOSIS — E66.01 CLASS 2 SEVERE OBESITY WITH SERIOUS COMORBIDITY AND BODY MASS INDEX (BMI) OF 35.0 TO 35.9 IN ADULT, UNSPECIFIED OBESITY TYPE (HCC): ICD-10-CM

## 2020-02-10 PROCEDURE — 99213 OFFICE O/P EST LOW 20 MIN: CPT | Performed by: FAMILY MEDICINE

## 2020-02-10 NOTE — PROGRESS NOTES
HPI:    Patient ID: Alison Alvarez is a 82year old female. HTN   This is a chronic problem. The current episode started more than 1 year ago. The problem is unchanged. The problem is controlled. Pertinent negatives include no chest pain or headaches.  [place] person, place, and time. Skin: Skin is warm and dry. ASSESSMENT/PLAN:   Essential hypertension  (primary encounter diagnosis)–patient continuing on valsartan/HCT. Tolerating well, blood pressure controlled.     Class 2 severe obesity with se

## 2020-02-22 ENCOUNTER — TELEPHONE (OUTPATIENT)
Dept: OTHER | Age: 85
End: 2020-02-22

## 2020-02-22 DIAGNOSIS — N39.0 URINARY TRACT INFECTION WITHOUT HEMATURIA, SITE UNSPECIFIED: Primary | ICD-10-CM

## 2020-02-22 NOTE — TELEPHONE ENCOUNTER
Last visit was 2/10/20 for follow up URI and cystitis.     Patient calling and states that her URI is gone but her UTI still ongoing, states with itchiness after each urination, no vaginal discharges, urine output normal, been drinking lots of water, no oth

## 2020-02-22 NOTE — TELEPHONE ENCOUNTER
Spoke with the patient and informed her of Dr. Kelly Jaquez orders. Patient voiced understanding and confirmed she will not get the urine culture done. She states it is not urgent.  Patient reports she will wait until Monday to get a response from Dr. Bobby Casarez

## 2020-02-23 NOTE — TELEPHONE ENCOUNTER
Please let her know I agree with plan. If urine culture is negative then may need office evaluation.

## 2020-02-24 ENCOUNTER — APPOINTMENT (OUTPATIENT)
Dept: LAB | Age: 85
End: 2020-02-24
Attending: FAMILY MEDICINE
Payer: MEDICARE

## 2020-02-24 DIAGNOSIS — N39.0 URINARY TRACT INFECTION WITHOUT HEMATURIA, SITE UNSPECIFIED: ICD-10-CM

## 2020-02-24 PROCEDURE — 87086 URINE CULTURE/COLONY COUNT: CPT

## 2020-02-24 NOTE — TELEPHONE ENCOUNTER
Spoke with pt and MD message below given. Pt verb understanding and agrees to plan. Will await culture results.

## 2020-02-25 RX ORDER — FLUCONAZOLE 150 MG/1
150 TABLET ORAL ONCE
Qty: 1 TABLET | Refills: 0 | Status: SHIPPED | OUTPATIENT
Start: 2020-02-25 | End: 2020-02-25

## 2020-02-25 NOTE — TELEPHONE ENCOUNTER
Advised patient of Dr. Stella murray. Patient verbalized understanding and agreed. Appointment made for 2/27/2020 at 11:45am with Dr Noe Torres in [facility].

## 2020-02-25 NOTE — TELEPHONE ENCOUNTER
Please go ahead and make appointment with me for 2/27. However I have sent Diflucan to pharmacy. She should take this today and if symptoms are much improved she can cancel Thursday appointment.

## 2020-02-27 ENCOUNTER — OFFICE VISIT (OUTPATIENT)
Dept: FAMILY MEDICINE CLINIC | Facility: CLINIC | Age: 85
End: 2020-02-27
Payer: MEDICARE

## 2020-02-27 VITALS
RESPIRATION RATE: 20 BRPM | TEMPERATURE: 98 F | SYSTOLIC BLOOD PRESSURE: 121 MMHG | DIASTOLIC BLOOD PRESSURE: 81 MMHG | HEART RATE: 100 BPM

## 2020-02-27 DIAGNOSIS — R10.13 EPIGASTRIC PAIN: Primary | ICD-10-CM

## 2020-02-27 DIAGNOSIS — N89.8 VAGINAL IRRITATION: ICD-10-CM

## 2020-02-27 PROCEDURE — 99213 OFFICE O/P EST LOW 20 MIN: CPT | Performed by: FAMILY MEDICINE

## 2020-02-27 RX ORDER — PANTOPRAZOLE SODIUM 40 MG/1
40 TABLET, DELAYED RELEASE ORAL
Qty: 28 TABLET | Refills: 2 | Status: SHIPPED | OUTPATIENT
Start: 2020-02-27 | End: 2020-03-12

## 2020-02-27 RX ORDER — NYSTATIN 100000 U/G
1 CREAM TOPICAL 2 TIMES DAILY
Qty: 30 G | Refills: 1 | Status: SHIPPED | OUTPATIENT
Start: 2020-02-27 | End: 2020-03-05

## 2020-02-27 RX ORDER — FLUCONAZOLE 150 MG/1
TABLET ORAL
COMMUNITY
Start: 2020-02-25 | End: 2020-02-27 | Stop reason: ALTCHOICE

## 2020-02-27 NOTE — PROGRESS NOTES
HPI:    Patient ID: Eugene Hernandez is a 82year old female. Abdominal Pain   This is a new problem. The current episode started in the past 7 days. The problem occurs daily. The problem has been waxing and waning.  The pain is located in the epigastric re sounds. No murmur heard. Pulmonary/Chest: Effort normal and breath sounds normal.   Abdominal: Soft. She exhibits no mass. There is no tenderness.    Genitourinary:    Genitourinary Comments: Mild erythema labia     Musculoskeletal:         General: No e

## 2020-03-03 ENCOUNTER — TELEPHONE (OUTPATIENT)
Dept: FAMILY MEDICINE CLINIC | Facility: CLINIC | Age: 85
End: 2020-03-03

## 2020-03-03 NOTE — TELEPHONE ENCOUNTER
Please let her know I recommend completing 2 full weeks then she can take it as needed if abdominal pain returns.

## 2020-03-03 NOTE — TELEPHONE ENCOUNTER
Andrae Lizama stopped in the [facility] office. You gave her Pantoprazole Sodium 40mg, quantity 84, to take for 2 weeks. She's taken it for 1 week and feels better. She would like to know if she still needs needs take it for another week if she's feeling better?  Does

## 2020-03-26 NOTE — TELEPHONE ENCOUNTER
Patient contacted office. States she took pantoprazole daily for 2 weeks as directed which improved her symptoms. She has been off for 2 weeks now and her symptoms have returned. Feels bloated with belching and gas. Experiencing burning at night.   Per

## 2020-06-30 RX ORDER — SIMVASTATIN 20 MG
TABLET ORAL
Qty: 90 TABLET | Refills: 3 | Status: SHIPPED | OUTPATIENT
Start: 2020-06-30 | End: 2021-07-06

## 2020-08-22 ENCOUNTER — NURSE TRIAGE (OUTPATIENT)
Dept: FAMILY MEDICINE CLINIC | Facility: CLINIC | Age: 85
End: 2020-08-22

## 2020-08-22 NOTE — TELEPHONE ENCOUNTER
Pt stated that for the past 4 days she has been having left knee pain. If she walks the pain is a 2/10 and it is a little swollen. Pt stated that there is no redness. If she is sitting down she has no pain.  Pt was given a appt to see  on Monday bu

## 2020-08-24 ENCOUNTER — OFFICE VISIT (OUTPATIENT)
Dept: FAMILY MEDICINE CLINIC | Facility: CLINIC | Age: 85
End: 2020-08-24
Payer: MEDICARE

## 2020-08-24 VITALS
RESPIRATION RATE: 20 BRPM | DIASTOLIC BLOOD PRESSURE: 80 MMHG | HEART RATE: 96 BPM | TEMPERATURE: 98 F | SYSTOLIC BLOOD PRESSURE: 132 MMHG

## 2020-08-24 DIAGNOSIS — M23.92 ACUTE INTERNAL DERANGEMENT OF LEFT KNEE: Primary | ICD-10-CM

## 2020-08-24 PROCEDURE — 99213 OFFICE O/P EST LOW 20 MIN: CPT | Performed by: FAMILY MEDICINE

## 2020-08-24 RX ORDER — TRAMADOL HYDROCHLORIDE 50 MG/1
50 TABLET ORAL EVERY 6 HOURS PRN
Qty: 30 TABLET | Refills: 1 | Status: SHIPPED | OUTPATIENT
Start: 2020-08-24 | End: 2020-11-05 | Stop reason: ALTCHOICE

## 2020-08-24 NOTE — PATIENT INSTRUCTIONS
Take tramadol with one extra strength Tylenol every morning and afternoon. Appley diclofenac(voltaren) gel 4 times a day for 1 week.

## 2020-08-24 NOTE — PROGRESS NOTES
HPI:    Patient ID: Bhavin Rivera is a 82year old female. Knee Pain    The pain is present in the left knee. This is a new problem. The current episode started in the past 7 days. The problem occurs daily. The problem has been gradually improving.  Frannie diagnosis)–patient was going down steps 1 week ago and felt sudden pain in her left knee. Since then she has had swelling, increased stiffness and pain. It is improved over the past 5 days.   Recommend continued rest, tramadol and acetaminophen in the mor

## 2020-09-21 RX ORDER — VALSARTAN AND HYDROCHLOROTHIAZIDE 160; 12.5 MG/1; MG/1
TABLET, FILM COATED ORAL
Qty: 90 TABLET | Refills: 3 | Status: SHIPPED | OUTPATIENT
Start: 2020-09-21 | End: 2021-09-08

## 2020-09-21 NOTE — TELEPHONE ENCOUNTER
I received this refill request for valsartan/HCT. I did send refill but please contact patient to be sure she is taking only this, not losartan/HCT or hydrochlorothiazide. It has been very confusing for many people with the medication shortages.

## 2020-09-22 NOTE — TELEPHONE ENCOUNTER
Spoke with patient (name and  verified). Informed of message below. Patient verbalized understanding and agrees with plan of care.

## 2020-10-05 ENCOUNTER — LAB ENCOUNTER (OUTPATIENT)
Dept: LAB | Age: 85
End: 2020-10-05
Attending: FAMILY MEDICINE
Payer: MEDICARE

## 2020-10-05 ENCOUNTER — OFFICE VISIT (OUTPATIENT)
Dept: FAMILY MEDICINE CLINIC | Facility: CLINIC | Age: 85
End: 2020-10-05
Payer: MEDICARE

## 2020-10-05 VITALS
HEIGHT: 58.5 IN | RESPIRATION RATE: 18 BRPM | BODY MASS INDEX: 34.81 KG/M2 | HEART RATE: 60 BPM | TEMPERATURE: 97 F | DIASTOLIC BLOOD PRESSURE: 82 MMHG | WEIGHT: 170.38 LBS | SYSTOLIC BLOOD PRESSURE: 161 MMHG

## 2020-10-05 DIAGNOSIS — H91.13 PRESBYCUSIS OF BOTH EARS: ICD-10-CM

## 2020-10-05 DIAGNOSIS — R73.03 PREDIABETES: ICD-10-CM

## 2020-10-05 DIAGNOSIS — E78.2 MIXED HYPERLIPIDEMIA: ICD-10-CM

## 2020-10-05 DIAGNOSIS — Z00.00 ENCOUNTER FOR ANNUAL HEALTH EXAMINATION: ICD-10-CM

## 2020-10-05 DIAGNOSIS — Z78.0 ASYMPTOMATIC MENOPAUSAL STATE: ICD-10-CM

## 2020-10-05 DIAGNOSIS — M15.9 PRIMARY OSTEOARTHRITIS INVOLVING MULTIPLE JOINTS: ICD-10-CM

## 2020-10-05 DIAGNOSIS — Z00.00 ROUTINE PHYSICAL EXAMINATION: Primary | ICD-10-CM

## 2020-10-05 DIAGNOSIS — Z86.010 HISTORY OF COLONIC POLYPS: ICD-10-CM

## 2020-10-05 DIAGNOSIS — H81.09 MENIERE'S DISEASE, UNSPECIFIED LATERALITY: ICD-10-CM

## 2020-10-05 DIAGNOSIS — M17.0 PRIMARY OSTEOARTHRITIS OF BOTH KNEES: ICD-10-CM

## 2020-10-05 DIAGNOSIS — E66.01 CLASS 2 SEVERE OBESITY WITH SERIOUS COMORBIDITY AND BODY MASS INDEX (BMI) OF 35.0 TO 35.9 IN ADULT, UNSPECIFIED OBESITY TYPE (HCC): ICD-10-CM

## 2020-10-05 DIAGNOSIS — I10 HYPERTENSION, BENIGN: ICD-10-CM

## 2020-10-05 DIAGNOSIS — M85.80 OSTEOPENIA, UNSPECIFIED LOCATION: ICD-10-CM

## 2020-10-05 DIAGNOSIS — I44.7 LEFT BUNDLE BRANCH BLOCK (LBBB): ICD-10-CM

## 2020-10-05 PROCEDURE — 80053 COMPREHEN METABOLIC PANEL: CPT

## 2020-10-05 PROCEDURE — G0439 PPPS, SUBSEQ VISIT: HCPCS | Performed by: FAMILY MEDICINE

## 2020-10-05 PROCEDURE — 90662 IIV NO PRSV INCREASED AG IM: CPT | Performed by: FAMILY MEDICINE

## 2020-10-05 PROCEDURE — 84443 ASSAY THYROID STIM HORMONE: CPT

## 2020-10-05 PROCEDURE — G0008 ADMIN INFLUENZA VIRUS VAC: HCPCS | Performed by: FAMILY MEDICINE

## 2020-10-05 PROCEDURE — 80061 LIPID PANEL: CPT

## 2020-10-05 PROCEDURE — 36415 COLL VENOUS BLD VENIPUNCTURE: CPT

## 2020-10-05 PROCEDURE — 83036 HEMOGLOBIN GLYCOSYLATED A1C: CPT

## 2020-10-05 RX ORDER — RIBOFLAVIN (VITAMIN B2) 100 MG
100 TABLET ORAL DAILY
COMMUNITY

## 2020-10-05 RX ORDER — AMLODIPINE BESYLATE 5 MG/1
5 TABLET ORAL DAILY
Qty: 90 TABLET | Refills: 3 | Status: SHIPPED | OUTPATIENT
Start: 2020-10-05 | End: 2020-12-01

## 2020-10-05 NOTE — PATIENT INSTRUCTIONS
Kalani Reyes's SCREENING SCHEDULE   Tests on this list are recommended by your physician but may not be covered, or covered at this frequency, by your insurer. Please check with your insurance carrier before scheduling to verify coverage.    PREVENTATIVE years- more often if abnormal There are no preventive care reminders to display for this patient. Update Health Maintenance if applicable    Flex Sigmoidoscopy Screen  Covered every 5 years No results found for this or any previous visit.  No flowsheet data performed in visit on 10/12/16   • FLU VACC PRSV FREE INC ANTIG   Orders placed or performed in visit on 11/30/15   • FLU VACC PRSV FREE INC ANTIG    Please get every year    Pneumococcal 13 (Prevnar)  Covered Once after 65 Orders placed or performed in vi regarding Advance Directives.

## 2020-10-05 NOTE — PROGRESS NOTES
HPI:   Joseph Georges is a 82year old female who presents for a Medicare Subsequent Annual Wellness visit (Pt already had Initial Annual Wellness).     Generally well  Her last annual assessment has been over 1 year: Annual Physical due on 10/05/2021 disease     Class 2 severe obesity with serious comorbidity and body mass index (BMI) of 35.0 to 35.9 in adult [facility])     Osteopenia     Primary osteoarthritis involving multiple joints     History of colonic polyps     Prediabetes     Left bundle branch bl 2012), Osteoporosis (1999), and Unspecified essential hypertension. She  has no past surgical history on file. Her family history includes Other in her father and mother. SOCIAL HISTORY:   She  reports that she has never smoked.  She has never used Visual Acuity  Right Eye Visual Acuity: Corrected Right Eye Chart Acuity: 20/30   Left Eye Visual Acuity: Corrected Left Eye Chart Acuity: 20/30   Both Eyes Visual Acuity: Corrected Both Eyes Chart Acuity: 20/30   Able To Tolerate Visual Acuity: Yes (Zostavax) 03/12/2008   • Zoster Vaccine Recombinant Adjuvanted (Shingrix) 03/11/2019, 08/17/2019   Pended Date(s) Pended   • FLU VAC High Dose 65 YRS & Older PRSV Free (07839) 10/05/2020        ASSESSMENT AND OTHER RELEVANT CHRONIC CONDITIONS:   Debbie Quinones symptomatic.     Prediabetes–diet and exercise recommendations discussed  -     ASSAY, THYROID STIM HORMONE; Future  -     HEMOGLOBIN A1C; Future    Primary osteoarthritis of both knees– bilateral knee pain is stable but she is experiencing bilateral knee w or if medically necessary Electrocardiogram date       Colorectal Cancer Screening      Colonoscopy Screen every 10 years There are no preventive care reminders to display for this patient.  Update Health Maintenance if applicable    Flex Sigmoidoscopy Scre Part B No vaccine history found This may be covered with your prescription benefits, but Medicare does not cover unless Medically needed    Zoster  Not covered by Medicare Part B No vaccine history found This may be covered with your pharmacy  prescription

## 2020-10-12 ENCOUNTER — TELEPHONE (OUTPATIENT)
Dept: FAMILY MEDICINE CLINIC | Facility: CLINIC | Age: 85
End: 2020-10-12

## 2020-10-12 NOTE — TELEPHONE ENCOUNTER
Brooklynn Lr from [address] [place] [address] scheduling dept calling and she states she trying to schedule the patient for her DEXA scan and she need the ICD 10 code for the procedure       Please advise   [phone]  Ask for her SRI this her direct number

## 2020-11-05 ENCOUNTER — OFFICE VISIT (OUTPATIENT)
Dept: FAMILY MEDICINE CLINIC | Facility: CLINIC | Age: 85
End: 2020-11-05
Payer: MEDICARE

## 2020-11-05 VITALS
HEART RATE: 64 BPM | HEIGHT: 58.5 IN | WEIGHT: 170 LBS | TEMPERATURE: 98 F | DIASTOLIC BLOOD PRESSURE: 75 MMHG | BODY MASS INDEX: 34.73 KG/M2 | SYSTOLIC BLOOD PRESSURE: 132 MMHG | RESPIRATION RATE: 18 BRPM

## 2020-11-05 DIAGNOSIS — I10 HYPERTENSION, BENIGN: Primary | ICD-10-CM

## 2020-11-05 DIAGNOSIS — H81.09 MENIERE'S DISEASE, UNSPECIFIED LATERALITY: ICD-10-CM

## 2020-11-05 NOTE — PROGRESS NOTES
HPI:    Patient ID: Alison Alvarez is a 82year old female. Hypertension  This is a chronic problem. The current episode started more than 1 year ago. The problem has been rapidly improving since onset. The problem is controlled.  Pertinent negatives incl Effort normal and breath sounds normal.   Lymphadenopathy:     She has no cervical adenopathy. Neurological: She is alert and oriented to person, place, and time. Skin: Skin is warm and dry.               ASSESSMENT/PLAN:   Hypertension, benign  (primar

## 2020-11-13 ENCOUNTER — TELEPHONE (OUTPATIENT)
Dept: FAMILY MEDICINE CLINIC | Facility: CLINIC | Age: 85
End: 2020-11-13

## 2020-11-13 NOTE — TELEPHONE ENCOUNTER
Patient contacted. Informed bone density osteopenia but without increased risk fracture. She notes that headaches have returned, as per Dr. Connie Sanchez recommendation we will discontinue amlodipine and start verapamil.   Reviewed instructions and side effects of

## 2020-12-01 ENCOUNTER — OFFICE VISIT (OUTPATIENT)
Dept: FAMILY MEDICINE CLINIC | Facility: CLINIC | Age: 85
End: 2020-12-01
Payer: MEDICARE

## 2020-12-01 VITALS
DIASTOLIC BLOOD PRESSURE: 78 MMHG | SYSTOLIC BLOOD PRESSURE: 150 MMHG | TEMPERATURE: 97 F | WEIGHT: 170 LBS | BODY MASS INDEX: 35 KG/M2 | HEART RATE: 74 BPM | RESPIRATION RATE: 18 BRPM

## 2020-12-01 DIAGNOSIS — I10 HYPERTENSION, BENIGN: ICD-10-CM

## 2020-12-01 DIAGNOSIS — N90.7 EPIDERMAL CYST OF VULVA: Primary | ICD-10-CM

## 2020-12-01 PROCEDURE — 99212 OFFICE O/P EST SF 10 MIN: CPT | Performed by: FAMILY MEDICINE

## 2020-12-01 RX ORDER — AMLODIPINE BESYLATE 5 MG/1
5 TABLET ORAL DAILY
Qty: 90 TABLET | Refills: 3 | COMMUNITY
Start: 2020-12-01 | End: 2021-07-15

## 2020-12-01 NOTE — PROGRESS NOTES
HPI:    Patient ID: Eugene Hernandez is a 82year old female. Vaginal Discharge  The patient's primary symptoms include genital lesions. The patient's pertinent negatives include no vaginal discharge. This is a new problem.  The current episode started in t place, and time. Skin: Skin is warm and dry. ASSESSMENT/PLAN:   Epidermal cyst of vulva  (primary encounter diagnosis)–patient noticed small nodule right vulva which ruptured releasing yellowish fluid.   Discussed diagnosis, recommend warm co

## 2020-12-17 RX ORDER — ASPIRIN 81 MG/1
TABLET, COATED ORAL
Qty: 90 TABLET | Refills: 3 | Status: SHIPPED | OUTPATIENT
Start: 2020-12-17 | End: 2021-12-16

## 2020-12-28 NOTE — TELEPHONE ENCOUNTER
Action Requested: Summary for Provider     []  Critical Lab, Recommendations Needed  [] Need Additional Advice  []   FYI    []   Need Orders  [x] Need Medications Sent to Pharmacy  []  Other     SUMMARY: REQUESTING ABX, sore throat    Pt states for 3 days Today's Date: 12/28/2020  Patient Name: Le Pappas  Date of admission: 12/18/2020  8:33 PM  Patient's age: 70 y.o., 1945  Admission Dx: Symptomatic anemia [D64.9]    Reason for Consult: anemia  Requesting Physician: Cailin Quiñones MD    CHIEF COMPLAINT:    Chief Complaint   Patient presents with    Abnormal Lab     Patient comes to the ER with a hemoglobin of 6.6, per her physician, to get a blood transfusion.  Shortness of Breath     Dx with pneumonia today by her physician after having a chest x-ray Thursday. Has not started her antibiotics. Says the drug store has yet to deliver it. SUBJECTIVE:    Patient seen and examined  Labs and vitals reviewed. Mentation is better. Pathology from thoracentesis pending. Patient overall denies any new complaints. No bleeding noted. Will check CBC tomorrow. HISTORY OF PRESENT ILLNESS IN BRIEF:    Le Pappas  is a 70 y.o. female who is admitted to the hospital for abnormal lab and hemoglobin of 6.6. Patient has recently been evaluated by Dr. Dilan Ashley hematologist as outpatient on 12/18 for chronic anemia, possible iron deficiency, history of AV malformation. He has ordered some labs and there was a plan to start patient on IV iron supplements if her lab work suggest significant iron deficiency. Her lab work at the office showed hemoglobin of 6.6 and therefore she was instructed to go to ER. Patient has been maintained on chronically anticoagulation with Eliquis for her history of atrial fibrillation. Past Medical History:   has a past medical history of A-fib ([facility] Acquired hypothyroidism, Acute encephalopathy, Acute kidney injury ([facility] [address] [facility] Anxiety, Benign hypertension with CKD (chronic kidney disease) stage III, Chronic back pain, CKD (chronic kidney disease) stage 3, GFR 30-59 ml/min, Constipation, COPD (chronic obstructive pulmonary disease) ([facility] [address] Cough, Depression, ETOH abuse, Former smoker, HA (generalized anxiety disorder), History of GI bleed, Hyperlipidemia, Hypertension, Hypothyroid, Leg pain, Normocytic anemia, Osteoarthritis, PAD (peripheral artery disease) ([facility] [address] [facility] Primary osteoarthritis, Pulmonary hypertension ([facility] [address] [facility] Pure hypercholesterolemia, SIRS (systemic inflammatory response syndrome) ([facility] Urge incontinence, and Wheezing. Past Surgical History:   has a past surgical history that includes eye surgery (Bilateral); Colonoscopy; joint replacement; Upper gastrointestinal endoscopy (N/A, 12/2/2020); Colonoscopy (N/A, 12/2/2020); Upper gastrointestinal endoscopy (N/A, 12/23/2020); and Thoracentesis (12/26/2020). Medications:    Prior to Admission medications    Medication Sig Start Date End Date Taking?  Authorizing Provider   tamsulosin (FLOMAX) 0.4 MG capsule TAKE 1 CAPSULE BY MOUTH DAILY 12/15/20  Yes WILLIE Ngo CNP   omeprazole (PRILOSEC) 40 MG delayed release capsule TAKE 1 CAPSULE BY MOUTH EVERY MORNING BEFORE BREAKFAST 12/14/20  Yes WILLIE Ngo CNP   DULoxetine (CYMBALTA) 60 MG extended release capsule Take 1 capsule by mouth daily 12/14/20  Yes WILLIE Ngo CNP   furosemide (LASIX) 40 MG tablet TAKE 1 TABLET BY MOUTH DAILY  Patient taking differently: Take 40 mg by mouth daily Currently taking 2 tablets daily  12/18/20 11/30/20  Yes WILLIE Ngo CNP   baclofen (LIORESAL) 10 MG tablet TAKE ONE TABLET BY MOUTH THREE TIMES A DAY AS NEEDED. 11/30/20  Yes WILLIE Ngo CNP HYDROcodone-acetaminophen (NORCO) 5-325 MG per tablet Take 1 tablet by mouth every 8 hours as needed for Pain for up to 30 days. 11/30/20 12/30/20 Yes Bryn Boast, APRN - CNP   ELIQUIS 5 MG TABS tablet TAKE ONE TABLET BY MOUTH TWICE A DAY 11/30/20  Yes Bryn Boast, APRN - CNP   levothyroxine (SYNTHROID) 125 MCG tablet Take 1 tablet by mouth daily 11/30/20  Yes Bryn Boast, APRN - CNP   metoprolol succinate (TOPROL XL) 25 MG extended release tablet TAKE 1 TABLET BY MOUTH DAILY. (PLEASE MAKE  APPT FOR FUTURE REFILLS) 11/23/20  Yes Bryn Boast, APRN - CNP   clonazePAM (KLONOPIN) 0.5 MG tablet Take 1 tablet by mouth 3 times daily as needed for Anxiety for up to 30 days.  11/17/20 12/19/20 Yes Bryn Boast, APRN - CNP   fluticasone (FLOVENT HFA) 220 MCG/ACT inhaler Inhale 2 puffs into the lungs 2 times daily 11/17/20  Yes Bryn Boast, APRN - CNP   albuterol (PROVENTIL) (2.5 MG/3ML) 0.083% nebulizer solution Take 3 mLs by nebulization every 6 hours as needed for Wheezing 11/5/20  Yes Bryn Boast, APRN - CNP   dilTIAZem (CARDIZEM CD) 120 MG extended release capsule Take 1 capsule by mouth daily 10/19/20  Yes Bryn Boast, APRN - CNP   albuterol sulfate HFA (PROAIR HFA) 108 (90 Base) MCG/ACT inhaler Inhale 2 puffs into the lungs every 6 hours as needed for Wheezing   Yes Historical Provider, MD   fluticasone (FLONASE) 50 MCG/ACT nasal spray USE ONE SPRAY TO EACH NOSTRIL ONCE ADAY 9/4/20  Yes WILLIE Huber CNP   atorvastatin (LIPITOR) 10 MG tablet TAKE 1 TABLET BY MOUTH ONCE DAILY 9/4/20  Yes WILLIE Huber CNP   Ascorbic Acid (C-1000 PO) Take 1 capsule by mouth daily    Yes Historical Provider, MD   ferrous sulfate 325 (65 Fe) MG tablet Take 325 mg by mouth daily  8/30/18  Yes Historical Provider, MD   Oxygen Concentrator continuous    Historical Provider, MD   Lift Chair MISC Use daily for comfort Patient not taking: Reported on 12/18/2020 11/30/20   Earnstine Dessert, WILLIE Keith CNP   PROAIR  (24 Base) MCG/ACT inhaler Inhale 2 puffs into the lungs every 6 hours as needed for Wheezing 11/17/20   Joselintine WILLIE Wagoner CNP   levalbuterol [facility] HFA) 45 MCG/ACT inhaler Inhale 1 puff into the lungs every 4 hours as needed for Wheezing 8/19/20   WILLIE Torres CNP   Respiratory Therapy Supplies (NEBULIZER/TUBING/MOUTHPIECE) KIT Use every 4-6 hours prn for copd 6/15/20   WILLIE Torres CNP     Current Facility-Administered Medications   Medication Dose Route Frequency Provider Last Rate Last Admin    bumetanide (BUMEX) tablet 1 mg  1 mg Oral BID Azeb Holm MD   1 mg at 12/28/20 7846    lisinopril (PRINIVIL;ZESTRIL) tablet 2.5 mg  2.5 mg Oral Lunch Chuck Parisi MD   2.5 mg at 12/28/20 1200    metoprolol tartrate (LOPRESSOR) tablet 25 mg  25 mg Oral BID Brennan Dougherty MD   25 mg at 12/28/20 0927    ondansetron (ZOFRAN) injection 4 mg  4 mg Intravenous Q6H PRN Dwayne Clemente MD   4 mg at 12/28/20 1154    guaiFENesin (MUCINEX) extended release tablet 1,200 mg  1,200 mg Oral BID Brennan Dougherty MD   1,200 mg at 12/28/20 0927    fluticasone (FLONASE) 50 MCG/ACT nasal spray 2 spray  2 spray Each Nostril Daily Dwayne Clemente MD   2 spray at 12/28/20 6371    sodium chloride flush 0.9 % injection 10 mL  10 mL Intravenous 2 times per day Brennan Dougherty MD   10 mL at 12/28/20 0939    sodium chloride flush 0.9 % injection 10 mL  10 mL Intravenous PRN Dwayne Clemente MD        acetaminophen (TYLENOL) tablet 650 mg  650 mg Oral Q4H PRN Dwayne Clemente MD   650 mg at 12/22/20 2106    ferrous sulfate (IRON 325) tablet 325 mg  325 mg Oral Daily Dwayne Clemente MD   325 mg at 12/28/20 0772    ascorbic acid (VITAMIN C) tablet 1,000 mg  1,000 mg Oral Daily Brennan Dougherty MD   1,000 mg at 12/28/20 0064  atorvastatin (LIPITOR) tablet 10 mg  10 mg Oral Daily Dwayne Clemente MD   10 mg at 12/28/20 2885    dilTIAZem (CARDIZEM CD) extended release capsule 120 mg  120 mg Oral Daily Dwayne Clemente MD   120 mg at 12/28/20 0928    albuterol (PROVENTIL) nebulizer solution 2.5 mg  2.5 mg Nebulization Q6H PRN Dwayne Clemente MD   2.5 mg at 12/24/20 1110    clonazePAM (KLONOPIN) tablet 0.5 mg  0.5 mg Oral TID PRN Britni Crawley MD   0.5 mg at 12/25/20 1827    fluticasone (FLOVENT HFA) 110 MCG/ACT inhaler 2 puff  2 puff Inhalation BID Britni Crawley MD   2 puff at 12/28/20 0938    baclofen (LIORESAL) tablet 10 mg  10 mg Oral TID PRN Britni Crawley MD   10 mg at 12/20/20 2118    HYDROcodone-acetaminophen (NORCO) 5-325 MG per tablet 1 tablet  1 tablet Oral Q8H PRN Britni Crawley MD   1 tablet at 12/28/20 1154    apixaban (ELIQUIS) tablet 5 mg  5 mg Oral BID Blanca Pride MD   5 mg at 12/28/20 1419    levothyroxine (SYNTHROID) tablet 125 mcg  125 mcg Oral Daily Dwayne Clemente MD   125 mcg at 12/28/20 0524    pantoprazole (PROTONIX) tablet 40 mg  40 mg Oral QAM AC Britni Crawley MD   40 mg at 12/28/20 0524    DULoxetine (CYMBALTA) extended release capsule 60 mg  60 mg Oral Daily Britni Crawley MD   60 mg at 12/28/20 0928    tamsulosin (FLOMAX) capsule 0.4 mg  0.4 mg Oral Daily Dwayne Clemente MD   0.4 mg at 12/28/20 7661    potassium chloride (KLOR-CON M) extended release tablet 40 mEq  40 mEq Oral PRN Britni Crawley MD   40 mEq at 12/28/20 0722    Or    potassium bicarb-citric acid (EFFER-K) effervescent tablet 40 mEq  40 mEq Oral PRN Dwayne Clemente MD        Or    potassium chloride 10 mEq/100 mL IVPB (Peripheral Line)  10 mEq Intravenous PRN Britni Crawley MD           Allergies:  Tizanidine Social History:   reports that she has quit smoking. Her smoking use included cigarettes. She has a 78.00 pack-year smoking history. She has never used smokeless tobacco. She reports current alcohol use. She reports that she does not use drugs. Family History: family history includes COPD in her mother; Cancer in an other family member; Emphysema in her sister; Heart Disease in her mother. REVIEW OF SYSTEMS:    Constitutional: ++fatigue, No fever or chills. No night sweats, no weight loss   Eyes: No eye discharge, double vision, or eye pain   HEENT: negative for sore mouth, sore throat, hoarseness and voice change   Respiratory: negative for cough , sputum, dyspnea, wheezing, hemoptysis, chest pain   Cardiovascular: negative for chest pain, dyspnea, palpitations, orthopnea, PND   Gastrointestinal: negative for nausea, vomiting, diarrhea, constipation, abdominal pain, Dysphagia, hematemesis and hematochezia   Genitourinary: negative for frequency, dysuria, nocturia, urinary incontinence, and hematuria   Integument: negative for rash, skin lesions, bruises.    Hematologic/Lymphatic: negative for easy bruising, bleeding, lymphadenopathy, or petechiae   Endocrine: negative for heat or cold intolerance,weight changes, change in bowel habits and hair loss   Musculoskeletal: negative for myalgias, arthralgias, pain, joint swelling,and bone pain   Neurological: negative for headaches, dizziness, seizures, weakness, numbness    PHYSICAL EXAM:      /66   Pulse 85   Temp 97.9 °F (36.6 °C) (Oral)   Resp 18   Ht 5' 5\" (1.651 m)   Wt 176 lb 5.9 oz (80 kg)   SpO2 94%   BMI 29.35 kg/m²    Temp (24hrs), Av.1 °F (36.7 °C), Min:97.9 °F (36.6 °C), Max:98.5 °F (36.9 °C)    General appearance - well appearing, no in pain or distress   Mental status - alert and cooperative   Eyes - pupils equal and reactive, extraocular eye movements intact   Ears - bilateral TM's and external ear canals normal Mouth - mucous membranes moist, pharynx normal without lesions   Neck - supple, no significant adenopathy   Lymphatics - no palpable lymphadenopathy, no hepatosplenomegaly   Chest - clear to auscultation, no wheezes, rales or rhonchi, symmetric air entry   Heart - normal rate, regular rhythm, normal S1, S2, no murmurs  Abdomen - soft, nontender, nondistended, no masses or organomegaly   Neurological - alert, oriented, normal speech, no focal findings or movement disorder noted   Musculoskeletal - no joint tenderness, deformity or swelling   Extremities - peripheral pulses normal, no pedal edema, no clubbing or cyanosis   Skin - normal coloration and turgor, no rashes, no suspicious skin lesions noted ,    DATA:    Labs:   CBC:   Recent Labs     12/27/20  0603   HGB 10.0*   HCT 31.6*     BMP:   Recent Labs     12/27/20  0603 12/28/20  0646   * 144   K 3.1* 3.3*   CO2 40* 44*   BUN 22 14   CREATININE 1.04* 0.92*   LABGLOM 52* 60*   GLUCOSE 121* 112*     PT/INR:   No results for input(s): PROTIME, INR in the last 72 hours. IMAGING DATA:  @IMG@   XR CHEST PORTABLE   Final Result   Cardiomegaly unchanged. Blunting of left costophrenic angle possibly due to small pleural effusion. Right pleural effusion on prior not appreciated         US THORACENTESIS Which side should the procedure be performed? Left   Final Result   Moderate left pleural effusion. XR CHEST (2 VW)   Final Result   Small-moderate left pleural effusion. Underlying opacity may represent   atelectasis with pneumonia not excluded. XR CHEST PORTABLE   Final Result   1. Left IJ central line in place, tip overlying the mid SVC   2. No evidence of a pneumothorax   3.  Small left pleural effusion, and left basilar opacity, differential   considerations include atelectasis versus pneumonia             Primary Problem  Symptomatic anemia    Active Hospital Problems    Diagnosis Date Noted    Symptomatic anemia [D64.9] 12/19/2020  Pneumonia due to organism [J18.9]     Iron deficiency anemia due to chronic blood loss [D50.0] 12/18/2020    Acute on chronic diastolic CHF (congestive heart failure) ([facility] [I50.33] 07/21/2020    Obesity, Class I, BMI 30-34.9 [E66.9] 07/20/2020    Moderate major depression ([facility] [F32.1] 06/17/2020    PAD (peripheral artery disease) [facility] [I73.9] 06/17/2020    CKD (chronic kidney disease) stage 3, GFR 30-59 ml/min [N18.30]     HA (generalized anxiety disorder) [F41.1] 12/13/2018    A-fib ([facility] [I48.91] 09/13/2018    Pulmonary hypertension ([facility] [I27.20] 09/13/2018    COPD (chronic obstructive pulmonary disease) ([facility] [J44.9] 09/13/2018    Acquired hypothyroidism [E03.9] 01/18/2013         IMPRESSION:   1. Severe anemia: Patient has history of chronic anemia. She is on chronic anticoagulation and history of female patient which can contribute to GI bleed. 2. SUNSHINE  3. Atrial fibrillation  4. COPD  5. GERD    RECOMMENDATIONS:  1. I reviewed the laboratory data, imaging studies, diagnosis, prognosis and treatment options with patient  2. H&H is stable  3. Follow-up on cytology from pleural effusion  4. Okay to discharge from medical oncology standpoint  5. Outpatient follow-up with Dr. Charlotte Gonzales in 4 weeks or so. Discussed with patient and Nurse. Thank you for asking us to see this patient. Bonny Coronado        This note is created with the assistance of a speech recognition program.  While intending to generate a document that actually reflects the content of the visit, the document can still have some errors including those of syntax and sound a like substitutions which may escape proof reading. It such instances, actual meaning can be extrapolated by contextual diversion.

## 2021-03-09 DIAGNOSIS — Z23 NEED FOR VACCINATION: ICD-10-CM

## 2021-04-27 ENCOUNTER — TELEPHONE (OUTPATIENT)
Dept: FAMILY MEDICINE CLINIC | Facility: CLINIC | Age: 86
End: 2021-04-27

## 2021-04-27 NOTE — TELEPHONE ENCOUNTER
Please contact patient to make appointment within the next week, okay to use res24. Can stop Coricidin, try Tylenol extra strength 2 tablets 3 times a day as needed for headaches. Recommend she stop watching news.

## 2021-04-27 NOTE — TELEPHONE ENCOUNTER
Spoke with patient--reports her blood pressure has been higher than normal for her--160/70s, HR 80s. \"But for the past 2 weeks, I have been getting hot flashes--my face and head, usually in the mornings--then it goes away for a while and comes back.  My

## 2021-04-29 ENCOUNTER — OFFICE VISIT (OUTPATIENT)
Dept: FAMILY MEDICINE CLINIC | Facility: CLINIC | Age: 86
End: 2021-04-29
Payer: MEDICARE

## 2021-04-29 ENCOUNTER — TELEPHONE (OUTPATIENT)
Dept: FAMILY MEDICINE CLINIC | Facility: CLINIC | Age: 86
End: 2021-04-29

## 2021-04-29 VITALS
SYSTOLIC BLOOD PRESSURE: 121 MMHG | WEIGHT: 173.63 LBS | RESPIRATION RATE: 18 BRPM | HEART RATE: 70 BPM | TEMPERATURE: 97 F | DIASTOLIC BLOOD PRESSURE: 77 MMHG | BODY MASS INDEX: 35.48 KG/M2 | HEIGHT: 58.5 IN

## 2021-04-29 DIAGNOSIS — F41.1 GENERALIZED ANXIETY DISORDER: ICD-10-CM

## 2021-04-29 DIAGNOSIS — G89.29 CHRONIC MIDLINE THORACIC BACK PAIN: ICD-10-CM

## 2021-04-29 DIAGNOSIS — M54.6 CHRONIC MIDLINE THORACIC BACK PAIN: ICD-10-CM

## 2021-04-29 DIAGNOSIS — G43.909 MIGRAINE WITHOUT STATUS MIGRAINOSUS, NOT INTRACTABLE, UNSPECIFIED MIGRAINE TYPE: ICD-10-CM

## 2021-04-29 DIAGNOSIS — I10 HYPERTENSION, BENIGN: Primary | ICD-10-CM

## 2021-04-29 PROCEDURE — 99213 OFFICE O/P EST LOW 20 MIN: CPT | Performed by: FAMILY MEDICINE

## 2021-05-28 ENCOUNTER — TELEPHONE (OUTPATIENT)
Dept: FAMILY MEDICINE CLINIC | Facility: CLINIC | Age: 86
End: 2021-05-28

## 2021-05-28 NOTE — TELEPHONE ENCOUNTER
Patient was instructed to follow up 6 weeks from her appointment on 4/29. Patient is requesting a sooner appointment than first available on 6/22. Please advise.  Thank you

## 2021-06-03 NOTE — PROGRESS NOTES
HPI/Subjective:   Patient ID: Joseph Georges is a 82year old female. Anxiety  This is a chronic problem. The current episode started more than 1 month ago. The problem occurs daily. The problem has been unchanged. Associated symptoms include headaches. difficulty sleeping. She has used Lexapro in the past but this caused tiredness. She does have some issues with chronic arthritis pain, back pain. Plan to try Cymbalta 30 mg daily for 1 week then 60 mg daily. Reviewed instructions and side effects.   St

## 2021-06-21 ENCOUNTER — TELEPHONE (OUTPATIENT)
Dept: INTERNAL MEDICINE CLINIC | Facility: CLINIC | Age: 86
End: 2021-06-21

## 2021-06-21 NOTE — TELEPHONE ENCOUNTER
Can you please have patient schedule follow-up visit with Dr. Casper Ansari next week to address the symptoms? By then, medication should be showing some effect on her symptoms.

## 2021-06-21 NOTE — TELEPHONE ENCOUNTER
Please advise. She would like to stop the Cymbalta and being given a medication for nervousness. The patient stated she continues to be nervous. She continues to shake at night when she sleeps.    She stated has been on Cymbalta for 3 weeks and has no

## 2021-06-21 NOTE — TELEPHONE ENCOUNTER
Spoke with patient, verified  and name. Informed of Dr. Sayda Butler instructions below. Patient would like to wait until her already scheduled visit to discuss medications. States she will keep taking medication until then and see how it goes.  Advised

## 2021-06-25 RX ORDER — DULOXETIN HYDROCHLORIDE 30 MG/1
60 CAPSULE, DELAYED RELEASE ORAL DAILY
Qty: 60 CAPSULE | Refills: 2 | Status: SHIPPED | OUTPATIENT
Start: 2021-06-25 | End: 2021-07-15

## 2021-06-26 NOTE — TELEPHONE ENCOUNTER
Son calling stating  patient still having chills and sweats at night. Son states he received patient's medications and found out valsartan has a side effect of sweats and chills. Son also state that amlodipine  interacts with simvastatin.    Per son, he

## 2021-07-06 RX ORDER — SIMVASTATIN 20 MG
TABLET ORAL
Qty: 90 TABLET | Refills: 3 | Status: SHIPPED | OUTPATIENT
Start: 2021-07-06

## 2021-07-15 ENCOUNTER — TELEPHONE (OUTPATIENT)
Dept: FAMILY MEDICINE CLINIC | Facility: CLINIC | Age: 86
End: 2021-07-15

## 2021-07-15 ENCOUNTER — LAB ENCOUNTER (OUTPATIENT)
Dept: LAB | Age: 86
End: 2021-07-15
Attending: FAMILY MEDICINE
Payer: MEDICARE

## 2021-07-15 DIAGNOSIS — R68.83 CHILLS: ICD-10-CM

## 2021-07-15 DIAGNOSIS — M25.511 CHRONIC PAIN OF BOTH SHOULDERS: ICD-10-CM

## 2021-07-15 DIAGNOSIS — G89.29 CHRONIC PAIN OF BOTH SHOULDERS: ICD-10-CM

## 2021-07-15 DIAGNOSIS — M25.512 CHRONIC PAIN OF BOTH SHOULDERS: ICD-10-CM

## 2021-07-15 LAB
ANION GAP SERPL CALC-SCNC: 7 MMOL/L (ref 0–18)
BUN BLD-MCNC: 27 MG/DL (ref 7–18)
BUN/CREAT SERPL: 22.5 (ref 10–20)
CALCIUM BLD-MCNC: 9.9 MG/DL (ref 8.5–10.1)
CHLORIDE SERPL-SCNC: 106 MMOL/L (ref 98–112)
CO2 SERPL-SCNC: 27 MMOL/L (ref 21–32)
CREAT BLD-MCNC: 1.2 MG/DL
DEPRECATED RDW RBC AUTO: 43.8 FL (ref 35.1–46.3)
ERYTHROCYTE [DISTWIDTH] IN BLOOD BY AUTOMATED COUNT: 13.3 % (ref 11–15)
ERYTHROCYTE [SEDIMENTATION RATE] IN BLOOD: 19 MM/HR
GLUCOSE BLD-MCNC: 106 MG/DL (ref 70–99)
HCT VFR BLD AUTO: 40.8 %
HGB BLD-MCNC: 12.9 G/DL
MCH RBC QN AUTO: 28.5 PG (ref 26–34)
MCHC RBC AUTO-ENTMCNC: 31.6 G/DL (ref 31–37)
MCV RBC AUTO: 90.1 FL
OSMOLALITY SERPL CALC.SUM OF ELEC: 296 MOSM/KG (ref 275–295)
PATIENT FASTING Y/N/NP: NO
PLATELET # BLD AUTO: 218 10(3)UL (ref 150–450)
POTASSIUM SERPL-SCNC: 4 MMOL/L (ref 3.5–5.1)
RBC # BLD AUTO: 4.53 X10(6)UL
SODIUM SERPL-SCNC: 140 MMOL/L (ref 136–145)
TSI SER-ACNC: 1.77 MIU/ML (ref 0.36–3.74)
WBC # BLD AUTO: 5.6 X10(3) UL (ref 4–11)

## 2021-07-15 PROCEDURE — 85027 COMPLETE CBC AUTOMATED: CPT

## 2021-07-15 PROCEDURE — 36415 COLL VENOUS BLD VENIPUNCTURE: CPT

## 2021-07-15 PROCEDURE — 84443 ASSAY THYROID STIM HORMONE: CPT

## 2021-07-15 PROCEDURE — 80048 BASIC METABOLIC PNL TOTAL CA: CPT

## 2021-07-15 PROCEDURE — 85652 RBC SED RATE AUTOMATED: CPT

## 2021-07-15 NOTE — TELEPHONE ENCOUNTER
Pt called wanting to give Dr message that she has already been taking 30MG of her Duloxetine because she sees it on her current bottle. She states she told Dr that she takes 36 and Dr told her that she should take 30mg now.    Pt wants to confirm if she c

## 2021-07-15 NOTE — PROGRESS NOTES
HPI/Subjective:   Patient ID: Merribeth Favre is a 82year old female. Patient presents to follow-up on multiple issues as below. Hearing Loss        History/Other:   Review of Systems   HENT: Positive for hearing loss.       Current Outpatient [facility] improvement. Chronic pain of both shoulders–chronic rotator cuff strain/osteoarthritis shoulders. Referred for physical therapy. Chills–patient has noted recurrent chills, no chest pain, dyspnea, change in appetite. Check labs as ordered.     Hypert

## 2021-07-19 NOTE — TELEPHONE ENCOUNTER
Verified pt name and . Reviewed doctor's medication instructions as noted below. Pt states she has been taking med once a day, agreed with plan of care.

## 2021-07-19 NOTE — TELEPHONE ENCOUNTER
Yes, continue to take 30 mg, although previously she MAY have been taking 2 a day, now just 1 a day.

## 2021-09-08 RX ORDER — VALSARTAN AND HYDROCHLOROTHIAZIDE 160; 12.5 MG/1; MG/1
1 TABLET, FILM COATED ORAL DAILY
Qty: 90 TABLET | Refills: 3 | Status: SHIPPED | OUTPATIENT
Start: 2021-09-08 | End: 2022-01-22

## 2021-09-08 NOTE — TELEPHONE ENCOUNTER
Please review. Protocol failed / No protocol.     Requested Prescriptions   Pending Prescriptions Disp Refills    VALSARTAN-HYDROCHLOROTHIAZIDE 160-12.5 MG Oral Tab [Pharmacy Med Name: Emmy Lilly 160-12.5 MG TAB] 90 tablet 3     Sig: TAKE 1 TABLET BY M

## 2021-09-10 RX ORDER — AMLODIPINE BESYLATE 5 MG/1
TABLET ORAL
Qty: 90 TABLET | Refills: 3 | OUTPATIENT
Start: 2021-09-10

## 2021-09-10 NOTE — PROGRESS NOTES
HPI/Subjective:   Patient ID: Iven Barthel is a 82year old female. Patient presents for hypertension, headache, anxiety as below    Headache   Associated symptoms include back pain. Back Pain  Associated symptoms include headaches.        History/Oth Progress Note         Patient: Carmel Cervantes MRN: [id number]  CSN: [id number]    YOB: 1952  Age: 81 y.o. Sex: female    DOA: 2021 LOS:  LOS: 0 days                    Subjective:     Carmel Cervantes is a 81 y.o. female with a PMHx of seizure disorder, status epilepticus, stroke, PE, HLD, and hallucinations who is admitted for recurrent breakthrough seizures and acute metabolic encephalopathy. Seen in room today  Lying in bed, does not respond verbally  Is clenching her left wrist with her right hand with a strong , does not respond verbally, withdraws from pain  Further evaluation and HPI extremely limited as patient is either not able to or refuses to participate    Discussed with son over the phone  He reports that she is compliant with her seizure medications at home, but she is talkative interactive, and can basically complete almost all of her ADLs without any assistance  He does not understand why she is like this  Nursing was able to learn that patient's son who she was very very close has  in the recent past    Nursing very concerned and reports that patient is hardly eaten or drinking for days  Also reported that patient will not take her p.o. meds, spits them out        Objective:     Physical Exam:  Visit Vitals  BP (!) 190/84   Pulse 100   Temp 99.4 °F (37.4 °C)   Resp 16   Ht 5' 3\" (1.6 m)   Wt 48.1 kg (106 lb)   SpO2 100%   BMI 18.78 kg/m²        General:         Does not respond to voice, withdraws from pain, uncooperative, no acute distress    HEENT: NC, Atraumatic. Anicteric sclerae. Lungs: CTA Bilaterally. No Wheezing/Rhonchi/Rales. Heart:  Regular  rhythm,  No murmur, No Rubs, No Gallops  Abdomen: Soft, Non distended, Non tender.   +Bowel sounds, no HSM  Extremities: No c/c/e, she clutches her left wrist with her right hand with good force  Psych:   Does not respond  Neurologic:  Patient either cannot or will not participate in exam    Intake and Output:  Current Shift:  No intake/output data recorded. Last three shifts:  09/09 0701 - 09/10 1900  In: 388.3 [I.V.:388.3]  Out: -     Labs: Results:       Chemistry Recent Labs     09/10/21  0100 09/09/21 0527 09/08/21 0514   GLU 90 81 43*    140 142   K 3.7 3.6 3.8   * 113* 114*   CO2 20* 20* 19*   BUN 10 13 15   CREA 1.36* 1.52* 1.75*   CA 9.5 9.3 8.8   AGAP 8 7 9   BUCR 7* 9* 9*   AP  --  53 49   TP  --  6.8 6.2*   ALB  --  3.5 3.2*   GLOB  --  3.3 3.0   AGRAT  --  1.1 1.1      CBC w/Diff Recent Labs     09/09/21 0527 09/08/21 0514   WBC 7.2 5.9   RBC 3.05* 2.72*   HGB 9.3* 8.4*   HCT 28.8* 25.7*    195   GRANS 59 45   LYMPH 29 43   EOS 1 1      Cardiac Enzymes No results for input(s): CPK, CKND1, GWENDOLYN in the last 72 hours. No lab exists for component: CKRMB, TROIP   Coagulation No results for input(s): PTP, INR, APTT, INREXT in the last 72 hours. Lipid Panel Lab Results   Component Value Date/Time    Cholesterol, total 144 05/14/2021 01:22 PM    HDL Cholesterol 46 05/14/2021 01:22 PM    LDL, calculated 77.6 05/14/2021 01:22 PM    VLDL, calculated 20.4 05/14/2021 01:22 PM    Triglyceride 102 05/14/2021 01:22 PM    CHOL/HDL Ratio 3.1 05/14/2021 01:22 PM      BNP No results for input(s): BNPP in the last 72 hours. Liver Enzymes Recent Labs     09/09/21 0527   TP 6.8   ALB 3.5   AP 53      Thyroid Studies Lab Results   Component Value Date/Time    TSH 0.93 05/14/2021 01:22 PM                Assessment and Plan:     Terrell Frances is a 81 y.o. female with a PMHx of seizure disorder, status epilepticus, stroke, PE, HLD, and hallucinations who is admitted for recurrent breakthrough seizures and acute metabolic encephalopathy. 1. Breakthrough seizures x2  2. Acute metabolic encephalopathy-delirium  3. Fall  4. Right periorbital contusion/edema  5. Abdominal pain-resolved  6. KEYLA on CKD stage III-resolved  7. Asymptomatic hypoglycemia-resolved  8. Seizure disorder  9.  Hx status benign  (primary encounter diagnosis)–patient continuing on valsartan/HCT and amlodipine. Had some elevated home blood pressures last week but normal since. Normal here today. Plan to continue current medication.   Follow up on sertraline with visit in 6 epilepticus  10. Hx stroke  11. Hx PE  12. Concern for significant depression  13. Hx medical noncompliance  14. Extremely poor oral intake    Neurology consulted and signed off  Nutrition consulted today for calorie count  We will consider placing NG tube for tube feeds over the weekend if patient's mental status or ability to participate in her care does not improve  CT head from 9/9 reviewed and shows no acute changes  Start IVF-D5 normal saline with 20 mEq KCl at 100 mL/h  Continue Keppra and oxcarbazepine  Continue other home meds  Duo nebs as needed  Check ammonia, ESR, CRP, TSH, vitamin B12 and folate, UDS  Follow-up CBC, CMP  PT, OT-recommending SNF/rehab  Palliative care consulted      Case discussed with:  []Patient  [x]Family  [x]Nursing  [x]Case Management  DVT prophylaxis: Lovenox  Diet: Dysphagia  Contact: Pamela Galindo (son)     [phone]  Code Status: Full  Disposition: Continue current care, greater than 2 nights      H. Karissa Lehman DO  9/10/2021       Dragon medical dictation software was used for portions of this report. Unintended errors may occur.

## 2021-09-17 RX ORDER — DULOXETIN HYDROCHLORIDE 30 MG/1
60 CAPSULE, DELAYED RELEASE ORAL DAILY
Qty: 180 CAPSULE | Refills: 1 | Status: SHIPPED | OUTPATIENT
Start: 2021-09-17

## 2021-09-17 NOTE — TELEPHONE ENCOUNTER
Refill passed per [facility], [facility] protocol.     Requested Prescriptions   Pending Prescriptions Disp Refills    DULOXETINE 30 MG Oral Cap DR Particles [Pharmacy Med Name: DULOXETINE HCL DR 30 MG CAP] 180 capsule 0     Sig: TAKE 2 CAPSULES BY MOUTH EVERY DAY

## 2021-09-30 NOTE — PROGRESS NOTES
Subjective:   Patient ID: Evelyn Wooten is a 82year old female. Anxiety  This is a chronic problem. The current episode started more than 1 year ago. The problem occurs daily. The problem has been gradually improving.  Pertinent negatives include no shanon No cervical adenopathy. Skin:     General: Skin is warm and dry. Neurological:      Mental Status: She is alert and oriented to person, place, and time.          Assessment & Plan:   Generalized anxiety disorder  (primary encounter diagnosis)–patient re

## 2021-11-22 ENCOUNTER — TELEPHONE (OUTPATIENT)
Dept: FAMILY MEDICINE CLINIC | Facility: CLINIC | Age: 86
End: 2021-11-22

## 2021-11-22 DIAGNOSIS — G89.29 CHRONIC PAIN OF BOTH SHOULDERS: Primary | ICD-10-CM

## 2021-11-22 DIAGNOSIS — M25.511 CHRONIC PAIN OF BOTH SHOULDERS: Primary | ICD-10-CM

## 2021-11-22 DIAGNOSIS — M25.512 CHRONIC PAIN OF BOTH SHOULDERS: Primary | ICD-10-CM

## 2021-11-22 NOTE — TELEPHONE ENCOUNTER
Home Health order placed.  I suppose you can fax the last note of [facility]?

## 2021-11-22 NOTE — TELEPHONE ENCOUNTER
Per Keira Savage with [facility], patient is requesting home health physical therapy. Please advise. Patient currently has an internal order for physical therapy.      If approved please fax order, H & P to [phone]

## 2021-12-16 RX ORDER — ASPIRIN 81 MG/1
81 TABLET ORAL DAILY
Qty: 90 TABLET | Refills: 3 | Status: SHIPPED | OUTPATIENT
Start: 2021-12-16 | End: 2023-01-18

## 2021-12-16 NOTE — TELEPHONE ENCOUNTER
Refill passed per [address] [place] [address] protocol.     Requested Prescriptions   Pending Prescriptions Disp Refills    ASPIRIN LOW DOSE 81 MG Oral Tab EC [Pharmacy Med Name: ASPIRIN EC 81 MG TABLET] 90 tablet 3     Sig: TAKE 1 TABLET BY MOUTH EVERY DAY        Aspirin Protocol Passed - 12/16/2021  1:31 PM        Passed - Appointment in past 6 or next 3 months              Future Appointments         Provider Department Appt Notes    In 1 month Cori Cardoza MD [address] [place] [address], [facility] [place] [facility]            Recent Outpatient Visits              2 months ago Generalized anxiety disorder    [address] [place] [address], Cori Locke MD    Office Visit    5 months ago Generalized anxiety disorder    [address] [place] [address]    Office Visit    6 months ago Generalized anxiety disorder    [address] [place] [address], Cori Locke MD    Office Visit    7 months ago Hypertension, benign    [address] [place] [address], [place] [address], [place] Dallin Alves MD    Office Visit    1 year ago Epidermal cyst of vulva    [address] [place] [address], [place] Dallin Alves MD    Office Visit

## 2021-12-23 ENCOUNTER — TELEPHONE (OUTPATIENT)
Dept: FAMILY MEDICINE CLINIC | Facility: CLINIC | Age: 86
End: 2021-12-23

## 2021-12-23 NOTE — TELEPHONE ENCOUNTER
Ori Barraza from [facility] health [facility] requesting most recent office visit notes to be faxed to them at Fax#: [phone]. Please advise.

## 2022-01-22 ENCOUNTER — OFFICE VISIT (OUTPATIENT)
Dept: FAMILY MEDICINE CLINIC | Facility: CLINIC | Age: 87
End: 2022-01-22
Payer: MEDICARE

## 2022-01-22 VITALS
TEMPERATURE: 98 F | WEIGHT: 169 LBS | RESPIRATION RATE: 18 BRPM | BODY MASS INDEX: 35.48 KG/M2 | SYSTOLIC BLOOD PRESSURE: 108 MMHG | HEART RATE: 92 BPM | DIASTOLIC BLOOD PRESSURE: 72 MMHG | HEIGHT: 57.87 IN

## 2022-01-22 DIAGNOSIS — I10 ESSENTIAL HYPERTENSION: ICD-10-CM

## 2022-01-22 DIAGNOSIS — H81.09 MENIERE'S DISEASE, UNSPECIFIED LATERALITY: ICD-10-CM

## 2022-01-22 DIAGNOSIS — Z86.010 HISTORY OF COLONIC POLYPS: ICD-10-CM

## 2022-01-22 DIAGNOSIS — F33.1 MAJOR DEPRESSIVE DISORDER, RECURRENT, MODERATE (HCC): ICD-10-CM

## 2022-01-22 DIAGNOSIS — N18.31 CHRONIC KIDNEY DISEASE, STAGE 3A (HCC): ICD-10-CM

## 2022-01-22 DIAGNOSIS — Z00.00 ENCOUNTER FOR ANNUAL HEALTH EXAMINATION: ICD-10-CM

## 2022-01-22 DIAGNOSIS — H91.13 PRESBYCUSIS OF BOTH EARS: ICD-10-CM

## 2022-01-22 DIAGNOSIS — E66.01 CLASS 2 SEVERE OBESITY WITH SERIOUS COMORBIDITY AND BODY MASS INDEX (BMI) OF 35.0 TO 35.9 IN ADULT, UNSPECIFIED OBESITY TYPE (HCC): ICD-10-CM

## 2022-01-22 DIAGNOSIS — Z00.00 ROUTINE PHYSICAL EXAMINATION: Primary | ICD-10-CM

## 2022-01-22 DIAGNOSIS — R73.03 PREDIABETES: ICD-10-CM

## 2022-01-22 DIAGNOSIS — Z00.00 WELLNESS EXAMINATION: ICD-10-CM

## 2022-01-22 DIAGNOSIS — R06.00 DOE (DYSPNEA ON EXERTION): ICD-10-CM

## 2022-01-22 PROCEDURE — G0439 PPPS, SUBSEQ VISIT: HCPCS | Performed by: FAMILY MEDICINE

## 2022-01-22 RX ORDER — VALSARTAN AND HYDROCHLOROTHIAZIDE 80; 12.5 MG/1; MG/1
1 TABLET, FILM COATED ORAL DAILY
Qty: 90 TABLET | Refills: 3 | Status: SHIPPED | OUTPATIENT
Start: 2022-01-22 | End: 2023-01-17

## 2022-01-22 NOTE — PROGRESS NOTES
Subjective:   Holli Sousa is a 82year old female who presents for a Medicare Subsequent Annual Wellness visit (Pt already had Initial Annual Wellness).    Overall stable still experiencing anxiety    History/Other:   Fall Risk Assessment: She has been (8/28/2015), and Unspecified essential hypertension. Surgical History:  She  has no past surgical history on file. Family History:  Her family history includes Other in her father and mother. Social History:  She  reports that she has never smoked.  She (Temporal)   Resp 18   Ht 4' 9.87\" (1.47 m)   Wt 169 lb (76.7 kg)   LMP  (LMP Unknown)   BMI 35.48 kg/m²  Estimated body mass index is 35.48 kg/m² as calculated from the following:    Height as of this encounter: 4' 9.87\" (1.47 m).     Weight as of this e repeat 2020, though difficult exam  9. Prediabetes– check fasting blood sugar today  Overview:  7/2016  10. Essential hypertension– blood pressure has been lower over the past year. Plan to change valsartan/HCT to 80/12.5 mg.  -     Lipid Panel;  Future; E DETAILS LAST COMPLETION DATE   Diabetes Screening    Fasting Blood Sugar /  Glucose    One screening every 12 months if never tested or if previously tested but not diagnosed with pre-diabetes   One screening every 6 months if diagnosed with pre-diabetes L patients aged 36 and older    One baseline mammogram covered for patients aged 32-38 -    No recommendations at this time    Immunizations    Influenza Covered once per flu season  Please get every year 09/30/2021  No recommendations at this time    Pneumo

## 2022-01-22 NOTE — PATIENT INSTRUCTIONS
Kalani Reyes's SCREENING SCHEDULE   Tests on this list are recommended by your physician but may not be covered, or covered at this frequency, by your insurer. Please check with your insurance carrier before scheduling to verify coverage.    PREVENTATI Never done   Pap and Pelvic    Pap   Covered every 2 years for women at normal risk;  Annually if at high risk -  No recommendations at this time    Chlamydia Annually if high risk -  No recommendations at this time   Screening Mammogram    Mammogram     Re

## 2022-01-24 LAB
ANION GAP SERPL CALC-SCNC: 9 MMOL/L (ref 0–18)
BUN BLD-MCNC: 28 MG/DL (ref 7–18)
BUN/CREAT SERPL: 25 (ref 10–20)
CALCIUM BLD-MCNC: 10.2 MG/DL (ref 8.5–10.1)
CHLORIDE SERPL-SCNC: 106 MMOL/L (ref 98–112)
CHOLEST SERPL-MCNC: 156 MG/DL (ref ?–200)
CO2 SERPL-SCNC: 25 MMOL/L (ref 21–32)
CREAT BLD-MCNC: 1.12 MG/DL
FASTING PATIENT LIPID ANSWER: YES
FASTING STATUS PATIENT QL REPORTED: YES
GLUCOSE BLD-MCNC: 77 MG/DL (ref 70–99)
HDLC SERPL-MCNC: 52 MG/DL (ref 40–59)
LDLC SERPL CALC-MCNC: 81 MG/DL (ref ?–100)
NONHDLC SERPL-MCNC: 104 MG/DL (ref ?–130)
OSMOLALITY SERPL CALC.SUM OF ELEC: 294 MOSM/KG (ref 275–295)
POTASSIUM SERPL-SCNC: 4.4 MMOL/L (ref 3.5–5.1)
SODIUM SERPL-SCNC: 140 MMOL/L (ref 136–145)
TRIGL SERPL-MCNC: 134 MG/DL (ref 30–149)
VLDLC SERPL CALC-MCNC: 21 MG/DL (ref 0–30)

## 2022-01-26 ENCOUNTER — LAB ENCOUNTER (OUTPATIENT)
Dept: LAB | Age: 87
End: 2022-01-26
Attending: FAMILY MEDICINE
Payer: MEDICARE

## 2022-01-26 DIAGNOSIS — F33.1 MAJOR DEPRESSIVE DISORDER, RECURRENT, MODERATE (HCC): ICD-10-CM

## 2022-01-26 DIAGNOSIS — Z00.00 ENCOUNTER FOR ANNUAL HEALTH EXAMINATION: ICD-10-CM

## 2022-01-26 DIAGNOSIS — E66.01 CLASS 2 SEVERE OBESITY WITH SERIOUS COMORBIDITY AND BODY MASS INDEX (BMI) OF 35.0 TO 35.9 IN ADULT, UNSPECIFIED OBESITY TYPE (HCC): ICD-10-CM

## 2022-01-26 DIAGNOSIS — H91.13 PRESBYCUSIS OF BOTH EARS: ICD-10-CM

## 2022-01-26 DIAGNOSIS — N18.31 CHRONIC KIDNEY DISEASE, STAGE 3A (HCC): ICD-10-CM

## 2022-01-26 DIAGNOSIS — Z00.00 ROUTINE PHYSICAL EXAMINATION: ICD-10-CM

## 2022-01-26 DIAGNOSIS — R06.00 DOE (DYSPNEA ON EXERTION): ICD-10-CM

## 2022-01-26 DIAGNOSIS — Z00.00 WELLNESS EXAMINATION: ICD-10-CM

## 2022-01-26 DIAGNOSIS — R73.03 PREDIABETES: ICD-10-CM

## 2022-01-26 DIAGNOSIS — I10 ESSENTIAL HYPERTENSION: ICD-10-CM

## 2022-01-26 DIAGNOSIS — Z86.010 HISTORY OF COLONIC POLYPS: ICD-10-CM

## 2022-01-26 DIAGNOSIS — H81.09 MENIERE'S DISEASE, UNSPECIFIED LATERALITY: ICD-10-CM

## 2022-01-26 LAB
DEPRECATED RDW RBC AUTO: 43.8 FL (ref 35.1–46.3)
ERYTHROCYTE [DISTWIDTH] IN BLOOD BY AUTOMATED COUNT: 13.3 % (ref 11–15)
HCT VFR BLD AUTO: 42.2 %
HGB BLD-MCNC: 13.1 G/DL
MCH RBC QN AUTO: 27.7 PG (ref 26–34)
MCHC RBC AUTO-ENTMCNC: 31 G/DL (ref 31–37)
MCV RBC AUTO: 89.2 FL
PLATELET # BLD AUTO: 248 10(3)UL (ref 150–450)
RBC # BLD AUTO: 4.73 X10(6)UL
WBC # BLD AUTO: 5.9 X10(3) UL (ref 4–11)

## 2022-01-26 PROCEDURE — 85027 COMPLETE CBC AUTOMATED: CPT

## 2022-01-26 PROCEDURE — 36415 COLL VENOUS BLD VENIPUNCTURE: CPT

## 2022-02-01 ENCOUNTER — TELEPHONE (OUTPATIENT)
Dept: FAMILY MEDICINE CLINIC | Facility: CLINIC | Age: 87
End: 2022-02-01

## 2022-03-09 RX ORDER — DULOXETIN HYDROCHLORIDE 30 MG/1
30 CAPSULE, DELAYED RELEASE ORAL DAILY
Qty: 90 CAPSULE | Refills: 1 | Status: SHIPPED | OUTPATIENT
Start: 2022-03-09 | End: 2022-09-06

## 2022-03-09 NOTE — TELEPHONE ENCOUNTER
Spoke with Pt. Verified name and . Pt confirmed she is taking 1 capsule 30mg daily. Duloxetine was decreased to 30mg from last visit 7/15/21    Called [facility] pharmacy, confirmed with [place] Devoid, they discontinued duloxetine 30mg 2 capsules daily prescription, and most current one is 30mg daily. Rx refilled per protocol.

## 2022-04-05 RX ORDER — BETAHISTINE HCL 100 %
16 POWDER (GRAM) MISCELLANEOUS 3 TIMES DAILY
Qty: 270 EACH | Refills: 1 | Status: SHIPPED | OUTPATIENT
Start: 2022-04-05 | End: 2023-01-31

## 2022-04-05 NOTE — TELEPHONE ENCOUNTER
Per patient, her previous doctor was prescribing Betahistine HCl (BETAHISTINE DIHYDROCHLORIDE) Does not apply Powder. The provider that was prescribing the medication has retired and patient is requesting Dr. Patricia Franz prescribe the medication. Per patient, Dr. Patricia Franz is aware she is taking this medication. Patient takes 3 a day and is requesting a 3 month supply. Please send prescription to [facility] [address].      [address], [address] [place] [address]

## 2022-04-05 NOTE — TELEPHONE ENCOUNTER
[facility] states patient is requesting refill of Betahistine 16 mg , three times a day quantity of 270. Medication listed as historical. Last prescribed by ENT. Please advise.

## 2022-05-18 ENCOUNTER — TELEPHONE (OUTPATIENT)
Dept: FAMILY MEDICINE CLINIC | Facility: CLINIC | Age: 87
End: 2022-05-18

## 2022-05-18 NOTE — TELEPHONE ENCOUNTER
Patient daughter is checking status on the fax that was sent on 5/17 in regards to the patient getting a handicap placard for the car, requesting a callback to discuss on [phone].

## 2022-05-18 NOTE — TELEPHONE ENCOUNTER
Soke with Malik Nuñez ( Kalani's daughter) notified her parking placard was faxed back to the fax number given, Daughter verbalized receipt.  No further action needed

## 2022-05-19 NOTE — TELEPHONE ENCOUNTER
Patients daughter stated the second page of the form was missing the doctors signature. Also stated the second page came over on a slant. She would like to have this corrected so she can mail it out today 5/19. Patient requesting a return call by .     Camille. [phone]  Fax [phone]

## 2022-05-19 NOTE — TELEPHONE ENCOUNTER
New parking placard form printed for Dr. Wilma Jones to sign then I will fax to number below.  Understanding verbalized

## 2022-05-19 NOTE — TELEPHONE ENCOUNTER
Form faxed to [phone].   Fax confirmations not coming through today, I will send a copy to be scanned

## 2022-05-20 NOTE — TELEPHONE ENCOUNTER
Spoke to daughter, ( verified patient's name and ). Writer did not see a GERALDO but she was just wanting to give information, not receive. She asked that patient's parking placard be marked permanent instead of temporary. She said, Pawan Ojeda mom is 80years old and her issues are not going away within 6 months. \"     Routed to Dr. Melissa Maddox and [facility] OPO staff- please advise.

## 2022-05-21 NOTE — TELEPHONE ENCOUNTER
Didn't call daughter yet but did redo placard. Missing diagnoses and signature. Pended placard in communication for Dr Jean Claude Bland and placed copy in paper bins upfront.

## 2022-05-23 NOTE — TELEPHONE ENCOUNTER
Spoke with Ashutosh Job ( liz's daughter) form needs to be checked for permanent disability.  Form re sent to daughter

## 2022-05-23 NOTE — TELEPHONE ENCOUNTER
This was previously taken care of. Daughter confirmed receipt per [facility].  No further action needed

## 2022-05-24 ENCOUNTER — TELEPHONE (OUTPATIENT)
Dept: FAMILY MEDICINE CLINIC | Facility: CLINIC | Age: 87
End: 2022-05-24

## 2022-05-24 NOTE — TELEPHONE ENCOUNTER
Patients daughter Tang Art calling to request a call back from Aissatou Monge only to discuss paperwork pickup     [phone]

## 2022-07-19 RX ORDER — SIMVASTATIN 20 MG
TABLET ORAL
Qty: 90 TABLET | Refills: 3 | Status: SHIPPED | OUTPATIENT
Start: 2022-07-19

## 2022-09-06 RX ORDER — DULOXETIN HYDROCHLORIDE 30 MG/1
CAPSULE, DELAYED RELEASE ORAL
Qty: 90 CAPSULE | Refills: 1 | Status: SHIPPED | OUTPATIENT
Start: 2022-09-06

## 2022-10-20 ENCOUNTER — OFFICE VISIT (OUTPATIENT)
Dept: FAMILY MEDICINE CLINIC | Facility: CLINIC | Age: 87
End: 2022-10-20
Payer: MEDICARE

## 2022-10-20 VITALS
DIASTOLIC BLOOD PRESSURE: 71 MMHG | SYSTOLIC BLOOD PRESSURE: 120 MMHG | BODY MASS INDEX: 37 KG/M2 | HEART RATE: 71 BPM | TEMPERATURE: 98 F | WEIGHT: 175.19 LBS

## 2022-10-20 DIAGNOSIS — Z78.0 ASYMPTOMATIC MENOPAUSAL STATE: ICD-10-CM

## 2022-10-20 DIAGNOSIS — N18.31 CHRONIC KIDNEY DISEASE, STAGE 3A (HCC): ICD-10-CM

## 2022-10-20 DIAGNOSIS — M85.80 OSTEOPENIA, UNSPECIFIED LOCATION: ICD-10-CM

## 2022-10-20 DIAGNOSIS — I10 ESSENTIAL HYPERTENSION: Primary | ICD-10-CM

## 2022-10-20 DIAGNOSIS — F33.1 MAJOR DEPRESSIVE DISORDER, RECURRENT, MODERATE (HCC): ICD-10-CM

## 2022-10-20 PROCEDURE — 90662 IIV NO PRSV INCREASED AG IM: CPT | Performed by: FAMILY MEDICINE

## 2022-10-20 PROCEDURE — G0008 ADMIN INFLUENZA VIRUS VAC: HCPCS | Performed by: FAMILY MEDICINE

## 2022-10-20 PROCEDURE — 99214 OFFICE O/P EST MOD 30 MIN: CPT | Performed by: FAMILY MEDICINE

## 2022-10-20 RX ORDER — TRIAMCINOLONE ACETONIDE 1 MG/G
CREAM TOPICAL 2 TIMES DAILY PRN
Qty: 60 G | Refills: 3 | Status: SHIPPED | OUTPATIENT
Start: 2022-10-20

## 2023-01-06 ENCOUNTER — HOSPITAL ENCOUNTER (OUTPATIENT)
Dept: BONE DENSITY | Age: 88
Discharge: HOME | End: 2023-01-06
Attending: FAMILY MEDICINE
Payer: MEDICARE

## 2023-01-06 DIAGNOSIS — Z78.0 ASYMPTOMATIC MENOPAUSAL STATE: ICD-10-CM

## 2023-01-06 DIAGNOSIS — M85.80 OSTEOPENIA, UNSPECIFIED LOCATION: ICD-10-CM

## 2023-01-06 PROCEDURE — 77080 DXA BONE DENSITY AXIAL: CPT | Performed by: FAMILY MEDICINE

## 2023-01-18 RX ORDER — VALSARTAN AND HYDROCHLOROTHIAZIDE 80; 12.5 MG/1; MG/1
TABLET, FILM COATED ORAL
Qty: 90 TABLET | Refills: 3 | Status: SHIPPED | OUTPATIENT
Start: 2023-01-18

## 2023-01-18 RX ORDER — ASPIRIN 81 MG/1
TABLET ORAL
Qty: 90 TABLET | Refills: 1 | Status: SHIPPED | OUTPATIENT
Start: 2023-01-18

## 2023-01-18 NOTE — TELEPHONE ENCOUNTER
Please review. Protocol failed. Requested Prescriptions   Pending Prescriptions Disp Refills    ASPIRIN 81 MG Oral Tab EC [Pharmacy Med Name: [facility] ASPIRIN EC 81 MG TABLET] 90 tablet 3     Sig: TAKE 1 TABLET BY MOUTH EVERY DAY       Aspirin Protocol Passed - 1/18/2023 12:16 AM        Passed - In person appointment or virtual visit in the past 6 mos or appointment in next 3 mos     Recent Outpatient Visits              3 months ago Essential hypertension    [place], [place]    Office Visit    12 months ago Routine physical examination    Ram[place] Faye, [facility] Joanna Santacruz MD    Office Visit    1 year ago Generalized anxiety disorder    [facility], [place] [address], [facility] Joanna Santacruz MD    Office Visit    1 year ago Generalized anxiety disorder    [facility], [place], [facility] Joanna Santacruz MD    Office Visit    1 year ago Generalized anxiety disorder    [facility], [place], [facility] Joanna Santacruz MD    Office Visit          Future Appointments         Provider Department Appt Notes    In 1 week MD Jen Gavin, [facility] FOLLOW UP 3 MONTHS                 VALSARTAN-HYDROCHLOROTHIAZIDE 80-12.5 MG Oral Tab [Pharmacy Med Name: Jaclyn Generous 80-12.5 MG TAB] 90 tablet 3     Sig: TAKE 1 TABLET BY MOUTH EVERY DAY       Hypertensive Medications Protocol Failed - 1/18/2023 12:16 AM        Failed - CMP or BMP in past 6 months     No results found for this or any previous visit (from the past 4392 hour(s)).             Failed - EGFRCR or GFRNAA > 50     GFR Evaluation  GFRNAA: 45 , resulted on 1/22/2022          Passed - In person appointment in the past 12 or next 3 months     Recent Outpatient Visits              3 months ago Essential hypertension    Ramone Faye, [place], [place] Office Visit    12 months ago Routine physical examination    Ab Capps, Kayla Cervantes MD    Office Visit    1 year ago Generalized anxiety disorder    [facility], [address], Kayla Cervantes MD    Office Visit    1 year ago Generalized anxiety disorder    [facility], [facility], [address] Kike Cervantes MD    Office Visit    1 year ago Generalized anxiety disorder    [facility], [address], Kayla Cervantes MD    Office Visit          Future Appointments         Provider Department Appt Notes    In 1 week MD Ab Haywood, [address] 3 MONTHS               Passed - Last BP reading less than 140/90     BP Readings from Last 1 Encounters:  10/20/22 : 120/71              Passed - In person appointment or virtual visit in the past 6 months     Recent Outpatient Visits              3 months ago Essential hypertension    [address], [address], Kayla Cervantes MD    Office Visit    12 months ago Routine physical examination    [address], [address], Kayla Cervantes MD    Office Visit    1 year ago Generalized anxiety disorder    [address], [address], Kayla Cervantes MD    Office Visit    1 year ago Generalized anxiety disorder    [address], [address], Kayla Cervantes MD    Office Visit    1 year ago Generalized anxiety disorder    [address], [address], Kayla Cervantes MD    Office Visit          Future Appointments         Provider Department Appt Notes    In 1 week MD Ab Haywood, [address]

## 2023-01-25 ENCOUNTER — LAB ENCOUNTER (OUTPATIENT)
Dept: LAB | Age: 88
End: 2023-01-25
Attending: FAMILY MEDICINE
Payer: MEDICARE

## 2023-01-25 ENCOUNTER — OFFICE VISIT (OUTPATIENT)
Dept: FAMILY MEDICINE CLINIC | Facility: CLINIC | Age: 88
End: 2023-01-25

## 2023-01-25 ENCOUNTER — TELEPHONE (OUTPATIENT)
Dept: FAMILY MEDICINE CLINIC | Facility: CLINIC | Age: 88
End: 2023-01-25

## 2023-01-25 VITALS
BODY MASS INDEX: 37.03 KG/M2 | TEMPERATURE: 97 F | HEART RATE: 64 BPM | HEIGHT: 57.5 IN | WEIGHT: 174 LBS | DIASTOLIC BLOOD PRESSURE: 62 MMHG | SYSTOLIC BLOOD PRESSURE: 126 MMHG

## 2023-01-25 DIAGNOSIS — R73.03 PREDIABETES: ICD-10-CM

## 2023-01-25 DIAGNOSIS — I10 HYPERTENSION, BENIGN: ICD-10-CM

## 2023-01-25 DIAGNOSIS — G89.29 CHRONIC PAIN OF BOTH SHOULDERS: ICD-10-CM

## 2023-01-25 DIAGNOSIS — N18.31 CHRONIC KIDNEY DISEASE, STAGE 3A (HCC): ICD-10-CM

## 2023-01-25 DIAGNOSIS — M25.512 CHRONIC PAIN OF BOTH SHOULDERS: ICD-10-CM

## 2023-01-25 DIAGNOSIS — M25.511 CHRONIC PAIN OF BOTH SHOULDERS: ICD-10-CM

## 2023-01-25 DIAGNOSIS — I10 HYPERTENSION, BENIGN: Primary | ICD-10-CM

## 2023-01-25 DIAGNOSIS — M25.562 ACUTE PAIN OF LEFT KNEE: ICD-10-CM

## 2023-01-25 LAB
ANION GAP SERPL CALC-SCNC: 8 MMOL/L (ref 0–18)
BASOPHILS # BLD AUTO: 0.02 X10(3) UL (ref 0–0.2)
BASOPHILS NFR BLD AUTO: 0.3 %
BUN BLD-MCNC: 32 MG/DL (ref 7–18)
BUN/CREAT SERPL: 27.4 (ref 10–20)
CALCIUM BLD-MCNC: 10.2 MG/DL (ref 8.5–10.1)
CHLORIDE SERPL-SCNC: 106 MMOL/L (ref 98–112)
CHOLEST SERPL-MCNC: 165 MG/DL (ref ?–200)
CO2 SERPL-SCNC: 28 MMOL/L (ref 21–32)
CREAT BLD-MCNC: 1.17 MG/DL
DEPRECATED RDW RBC AUTO: 42.8 FL (ref 35.1–46.3)
EOSINOPHIL # BLD AUTO: 0.11 X10(3) UL (ref 0–0.7)
EOSINOPHIL NFR BLD AUTO: 1.7 %
ERYTHROCYTE [DISTWIDTH] IN BLOOD BY AUTOMATED COUNT: 13.3 % (ref 11–15)
ERYTHROCYTE [SEDIMENTATION RATE] IN BLOOD: 53 MM/HR
EST. AVERAGE GLUCOSE BLD GHB EST-MCNC: 126 MG/DL (ref 68–126)
FASTING PATIENT LIPID ANSWER: YES
FASTING STATUS PATIENT QL REPORTED: YES
GFR SERPLBLD BASED ON 1.73 SQ M-ARVRAT: 46 ML/MIN/1.73M2 (ref 60–?)
GLUCOSE BLD-MCNC: 115 MG/DL (ref 70–99)
HBA1C MFR BLD: 6 % (ref ?–5.7)
HCT VFR BLD AUTO: 44.4 %
HDLC SERPL-MCNC: 56 MG/DL (ref 40–59)
HGB BLD-MCNC: 14.5 G/DL
IMM GRANULOCYTES # BLD AUTO: 0.02 X10(3) UL (ref 0–1)
IMM GRANULOCYTES NFR BLD: 0.3 %
LDLC SERPL CALC-MCNC: 81 MG/DL (ref ?–100)
LYMPHOCYTES # BLD AUTO: 1.22 X10(3) UL (ref 1–4)
LYMPHOCYTES NFR BLD AUTO: 18.6 %
MCH RBC QN AUTO: 28.5 PG (ref 26–34)
MCHC RBC AUTO-ENTMCNC: 32.7 G/DL (ref 31–37)
MCV RBC AUTO: 87.4 FL
MONOCYTES # BLD AUTO: 0.58 X10(3) UL (ref 0.1–1)
MONOCYTES NFR BLD AUTO: 8.8 %
NEUTROPHILS # BLD AUTO: 4.61 X10 (3) UL (ref 1.5–7.7)
NEUTROPHILS # BLD AUTO: 4.61 X10(3) UL (ref 1.5–7.7)
NEUTROPHILS NFR BLD AUTO: 70.3 %
NONHDLC SERPL-MCNC: 109 MG/DL (ref ?–130)
OSMOLALITY SERPL CALC.SUM OF ELEC: 302 MOSM/KG (ref 275–295)
PLATELET # BLD AUTO: 250 10(3)UL (ref 150–450)
POTASSIUM SERPL-SCNC: 4.1 MMOL/L (ref 3.5–5.1)
RBC # BLD AUTO: 5.08 X10(6)UL
SODIUM SERPL-SCNC: 142 MMOL/L (ref 136–145)
TRIGL SERPL-MCNC: 167 MG/DL (ref 30–149)
VLDLC SERPL CALC-MCNC: 26 MG/DL (ref 0–30)
WBC # BLD AUTO: 6.6 X10(3) UL (ref 4–11)

## 2023-01-25 PROCEDURE — 83036 HEMOGLOBIN GLYCOSYLATED A1C: CPT

## 2023-01-25 PROCEDURE — 1125F AMNT PAIN NOTED PAIN PRSNT: CPT | Performed by: FAMILY MEDICINE

## 2023-01-25 PROCEDURE — 80048 BASIC METABOLIC PNL TOTAL CA: CPT

## 2023-01-25 PROCEDURE — 85025 COMPLETE CBC W/AUTO DIFF WBC: CPT

## 2023-01-25 PROCEDURE — 36415 COLL VENOUS BLD VENIPUNCTURE: CPT

## 2023-01-25 PROCEDURE — 80061 LIPID PANEL: CPT

## 2023-01-25 PROCEDURE — 99213 OFFICE O/P EST LOW 20 MIN: CPT | Performed by: FAMILY MEDICINE

## 2023-01-25 PROCEDURE — 85652 RBC SED RATE AUTOMATED: CPT

## 2023-01-25 RX ORDER — PREDNISONE 20 MG/1
40 TABLET ORAL DAILY
Qty: 7 TABLET | Refills: 0 | Status: SHIPPED | OUTPATIENT
Start: 2023-01-25 | End: 2023-01-25

## 2023-01-25 RX ORDER — PREDNISONE 20 MG/1
20 TABLET ORAL DAILY
Qty: 7 TABLET | Refills: 0 | Status: SHIPPED | OUTPATIENT
Start: 2023-01-25 | End: 2023-02-01

## 2023-01-25 RX ORDER — ASPIRIN 81 MG/1
TABLET ORAL
COMMUNITY
Start: 2022-10-21

## 2023-01-25 RX ORDER — ALENDRONATE SODIUM 70 MG/1
70 TABLET ORAL
Qty: 12 TABLET | Refills: 3 | Status: SHIPPED | OUTPATIENT
Start: 2023-01-25

## 2023-01-25 NOTE — TELEPHONE ENCOUNTER
Needs clarification on quantity as script is written for 7 days, but dispense amount is also 7 tablets

## 2023-01-26 ENCOUNTER — HOSPITAL ENCOUNTER (OUTPATIENT)
Dept: GENERAL RADIOLOGY | Age: 88
Discharge: HOME | End: 2023-01-26
Attending: FAMILY MEDICINE
Payer: MEDICARE

## 2023-01-26 DIAGNOSIS — M25.511 CHRONIC PAIN OF BOTH SHOULDERS: ICD-10-CM

## 2023-01-26 DIAGNOSIS — G89.29 CHRONIC PAIN OF BOTH SHOULDERS: ICD-10-CM

## 2023-01-26 DIAGNOSIS — M25.512 CHRONIC PAIN OF BOTH SHOULDERS: ICD-10-CM

## 2023-01-26 PROCEDURE — 73030 X-RAY EXAM OF SHOULDER: CPT | Performed by: FAMILY MEDICINE

## 2023-01-30 ENCOUNTER — TELEPHONE (OUTPATIENT)
Dept: FAMILY MEDICINE CLINIC | Facility: CLINIC | Age: 88
End: 2023-01-30

## 2023-01-30 NOTE — TELEPHONE ENCOUNTER
Patient states she was prescribed PREDNISONE by Dr. Meir Schroeder and was asked to call with an update. Patient states she still has 2 more days to complete treatment but as of right now she is still feeling the same pain. patient will call back once she completes treatment.

## 2023-01-30 NOTE — TELEPHONE ENCOUNTER
Call from son Jose Whitehead, verified patient's name and . Asking if she can have grapefruit juice if she is on simvastatin? Advised not recommended as it can cause level of simvastatin to increase and cause possible side effects. Jose Whitehead verbalized understanding.

## 2023-01-31 RX ORDER — BETAHISTINE HCL 100 %
16 POWDER (GRAM) MISCELLANEOUS 3 TIMES DAILY
Qty: 270 EACH | Refills: 1 | Status: SHIPPED | OUTPATIENT
Start: 2023-01-31

## 2023-01-31 NOTE — TELEPHONE ENCOUNTER
Please review. No protocol. Requested Prescriptions   Pending Prescriptions Disp Refills    Betahistine HCl (BETAHISTINE DIHYDROCHLORIDE) Does not apply Powder 270 each 1     Sig: Take 16 mg by mouth in the morning, at noon, and at bedtime. Po TID per ENT 16 MG CAPSULE.  Use [facility]       There is no refill protocol information for this order

## 2023-03-09 ENCOUNTER — OFFICE VISIT (OUTPATIENT)
Dept: FAMILY MEDICINE CLINIC | Facility: CLINIC | Age: 88
End: 2023-03-09

## 2023-03-09 VITALS
BODY MASS INDEX: 38 KG/M2 | SYSTOLIC BLOOD PRESSURE: 134 MMHG | HEART RATE: 73 BPM | TEMPERATURE: 98 F | WEIGHT: 178 LBS | DIASTOLIC BLOOD PRESSURE: 75 MMHG

## 2023-03-09 DIAGNOSIS — M67.919 TENDINOPATHY OF ROTATOR CUFF, UNSPECIFIED LATERALITY: ICD-10-CM

## 2023-03-09 DIAGNOSIS — M19.011 LOCALIZED PRIMARY OSTEOARTHRITIS OF SHOULDER REGIONS, BILATERAL: Primary | ICD-10-CM

## 2023-03-09 DIAGNOSIS — M19.012 LOCALIZED PRIMARY OSTEOARTHRITIS OF SHOULDER REGIONS, BILATERAL: Primary | ICD-10-CM

## 2023-03-09 DIAGNOSIS — M85.80 OSTEOPENIA, UNSPECIFIED LOCATION: ICD-10-CM

## 2023-03-09 PROCEDURE — 99213 OFFICE O/P EST LOW 20 MIN: CPT | Performed by: FAMILY MEDICINE

## 2023-03-13 RX ORDER — DULOXETIN HYDROCHLORIDE 30 MG/1
30 CAPSULE, DELAYED RELEASE ORAL DAILY
Qty: 90 CAPSULE | Refills: 3 | Status: SHIPPED | OUTPATIENT
Start: 2023-03-13

## 2023-03-13 NOTE — TELEPHONE ENCOUNTER
Refill passed per [place] [employer], [facility] protocol.   Requested Prescriptions   Pending Prescriptions Disp Refills    DULOXETINE 30 MG Oral Cap DR Particles [Pharmacy Med Name: DULOXETINE HCL DR 30 MG CAP] 90 capsule 1     Sig: TAKE 1 CAPSULE BY MOUTH EVERY DAY       Psychiatric Non-Scheduled (Anti-Anxiety) Passed - 3/13/2023 12:23 AM        Passed - In person appointment or virtual visit in the past 6 mos or appointment in next 3 mos     Recent Outpatient Visits              4 days ago Localized primary osteoarthritis of shoulder regions, bilateral    [facility], [address], [facility] Caio Mike MD    Office Visit    1 month ago Hypertension, benign    [facility], [address], [facility] Caio Mike MD    Office Visit    4 months ago Essential hypertension    [address], [address], Deyanira Torres MD    Office Visit    1 year ago Routine physical examination    [address], [address], [facility] Caio Mike MD    Office Visit    1 year ago Generalized anxiety disorder    [address], [address], [facility] Caio Mike MD    Office Visit          Future Appointments         Provider Department Appt Notes    In 1 month Caio Mike MD [address], [address], [facility] annual [place] wellness exam                  Recent Outpatient Visits              4 days ago Localized primary osteoarthritis of shoulder regions, bilateral    [address], [address], Deyanira Torres MD    Office Visit    1 month ago Hypertension, benign    Deyanira Richard MD    Office Visit    4 months ago Essential hypertension    Deyanira Richard MD    Office Visit    1 year ago Routine physical examination    [address], [address], Deyanira Torres MD Office Visit    1 year ago Generalized anxiety disorder    [facility], [address], [address] Jessica Ross MD    Office Visit          Future Appointments         Provider Department Appt Notes    In 1 month Jessica Ross MD [address] [facility], [address] annual [place] wellness exam

## 2023-03-21 ENCOUNTER — OFFICE VISIT (OUTPATIENT)
Dept: FAMILY MEDICINE CLINIC | Facility: CLINIC | Age: 88
End: 2023-03-21

## 2023-03-21 VITALS
HEART RATE: 74 BPM | DIASTOLIC BLOOD PRESSURE: 78 MMHG | BODY MASS INDEX: 38 KG/M2 | WEIGHT: 178 LBS | SYSTOLIC BLOOD PRESSURE: 122 MMHG | TEMPERATURE: 98 F

## 2023-03-21 DIAGNOSIS — M17.12 OSTEOARTHRITIS OF LEFT KNEE, UNSPECIFIED OSTEOARTHRITIS TYPE: Primary | ICD-10-CM

## 2023-03-21 PROCEDURE — 20610 DRAIN/INJ JOINT/BURSA W/O US: CPT | Performed by: FAMILY MEDICINE

## 2023-03-21 RX ORDER — TRIAMCINOLONE ACETONIDE 40 MG/ML
40 INJECTION, SUSPENSION INTRA-ARTICULAR; INTRAMUSCULAR ONCE
Status: COMPLETED | OUTPATIENT
Start: 2023-03-21 | End: 2023-03-21

## 2023-03-21 RX ADMIN — TRIAMCINOLONE ACETONIDE 40 MG: 40 INJECTION, SUSPENSION INTRA-ARTICULAR; INTRAMUSCULAR at 11:58:00

## 2023-04-29 ENCOUNTER — OFFICE VISIT (OUTPATIENT)
Dept: FAMILY MEDICINE CLINIC | Facility: CLINIC | Age: 88
End: 2023-04-29

## 2023-04-29 VITALS
HEART RATE: 67 BPM | SYSTOLIC BLOOD PRESSURE: 109 MMHG | BODY MASS INDEX: 37.11 KG/M2 | WEIGHT: 172 LBS | HEIGHT: 57 IN | TEMPERATURE: 97 F | DIASTOLIC BLOOD PRESSURE: 70 MMHG

## 2023-04-29 DIAGNOSIS — H91.13 PRESBYCUSIS OF BOTH EARS: ICD-10-CM

## 2023-04-29 DIAGNOSIS — E78.2 MIXED HYPERLIPIDEMIA: ICD-10-CM

## 2023-04-29 DIAGNOSIS — E66.01 CLASS 2 SEVERE OBESITY DUE TO EXCESS CALORIES WITH SERIOUS COMORBIDITY AND BODY MASS INDEX (BMI) OF 37.0 TO 37.9 IN ADULT (HCC): ICD-10-CM

## 2023-04-29 DIAGNOSIS — I44.7 LEFT BUNDLE BRANCH BLOCK (LBBB): ICD-10-CM

## 2023-04-29 DIAGNOSIS — H81.09 MENIERE'S DISEASE, UNSPECIFIED LATERALITY: ICD-10-CM

## 2023-04-29 DIAGNOSIS — I10 HYPERTENSION, BENIGN: ICD-10-CM

## 2023-04-29 DIAGNOSIS — Z00.00 ROUTINE PHYSICAL EXAMINATION: Primary | ICD-10-CM

## 2023-04-29 DIAGNOSIS — N18.31 CHRONIC KIDNEY DISEASE, STAGE 3A (HCC): ICD-10-CM

## 2023-04-29 DIAGNOSIS — M85.80 OSTEOPENIA, UNSPECIFIED LOCATION: ICD-10-CM

## 2023-04-29 DIAGNOSIS — Z00.00 ENCOUNTER FOR ANNUAL HEALTH EXAMINATION: ICD-10-CM

## 2023-04-29 DIAGNOSIS — M17.12 OSTEOARTHROSIS, LOCALIZED, PRIMARY, KNEE, LEFT: ICD-10-CM

## 2023-04-29 DIAGNOSIS — F33.1 MAJOR DEPRESSIVE DISORDER, RECURRENT, MODERATE (HCC): ICD-10-CM

## 2023-04-29 DIAGNOSIS — R73.03 PREDIABETES: ICD-10-CM

## 2023-04-29 PROBLEM — E66.812 CLASS 2 SEVERE OBESITY DUE TO EXCESS CALORIES WITH SERIOUS COMORBIDITY AND BODY MASS INDEX (BMI) OF 37.0 TO 37.9 IN ADULT (HCC): Status: ACTIVE | Noted: 2023-04-29

## 2023-04-29 PROCEDURE — G0439 PPPS, SUBSEQ VISIT: HCPCS | Performed by: FAMILY MEDICINE

## 2023-04-29 PROCEDURE — 1125F AMNT PAIN NOTED PAIN PRSNT: CPT | Performed by: FAMILY MEDICINE

## 2023-07-20 RX ORDER — ASPIRIN 81 MG/1
81 TABLET ORAL DAILY
Qty: 90 TABLET | Refills: 3 | Status: SHIPPED | OUTPATIENT
Start: 2023-07-20

## 2023-07-20 RX ORDER — SIMVASTATIN 20 MG
20 TABLET ORAL NIGHTLY
Qty: 90 TABLET | Refills: 3 | Status: SHIPPED | OUTPATIENT
Start: 2023-07-20

## 2023-07-20 NOTE — TELEPHONE ENCOUNTER
Please review. Protocol failed    Aspirin is listed as patient reported.     Requested Prescriptions   Pending Prescriptions Disp Refills    SIMVASTATIN 20 MG Oral Tab [Pharmacy Med Name: SIMVASTATIN 20 MG TABLET] 90 tablet 3     Sig: TAKE 1 TABLET BY MOUTH EVERYDAY AT BEDTIME       Cholesterol Medication Protocol Failed - 7/20/2023  1:13 AM        Failed - ALT in past 12 months        Failed - Last ALT < 80     Lab Results   Component Value Date    ALT 18 10/05/2020             Passed - LDL in past 12 months        Passed - Last LDL < 130     Lab Results   Component Value Date    LDL 81 01/25/2023             Passed - In person appointment or virtual visit in the past 12 mos or appointment in next 3 mos     Recent Outpatient Visits              2 months ago Routine physical examination    [facility] [place]    Office Visit    4 months ago Osteoarthritis of left knee, unspecified osteoarthritis type    [facility] [place]    Office Visit    4 months ago Localized primary osteoarthritis of shoulder regions, bilateral    [facility] [address] Debbie Enriquez MD    Office Visit    5 months ago Hypertension, benign    [facility] [place]    Office Visit    9 months ago Essential hypertension    [address], [address]    Office Visit                        ASPIRIN LOW DOSE 81 MG Oral Tab EC [Pharmacy Med Name: ASPIRIN EC 81 MG TABLET] 90 tablet 1     Sig: TAKE 1 TABLET BY MOUTH EVERY DAY       Aspirin Protocol Passed - 7/20/2023  1:13 AM        Passed - In person appointment or virtual visit in the past 6 mos or appointment in next 3 mos     Recent Outpatient Visits              2 months ago Routine physical examination    [address] [facility] MD JULIO CESAR    Office Visit    4 months ago Osteoarthritis of left knee, unspecified osteoarthritis type    [facility], [address], Pema Hutchins MD    Office Visit    4 months ago Localized primary osteoarthritis of shoulder regions, bilateral    [facility], [address], Pema Hutchins MD    Office Visit    5 months ago Hypertension, benign    Pema Barfield MD    Office Visit    9 months ago Essential hypertension    Pema Barfield MD    Office Visit                            Recent Outpatient Visits              2 months ago Routine physical examination    Pema Barfield MD    Office Visit    4 months ago Osteoarthritis of left knee, unspecified osteoarthritis type    [facility], [address], Pema Hutchins MD    Office Visit    4 months ago Localized primary osteoarthritis of shoulder regions, bilateral    [facility], [address], Pema Hutchins MD    Office Visit    5 months ago Hypertension, benign    [facility], [address], Pema Hutchins MD    Office Visit    9 months ago Essential hypertension    [facility] Pema Hutchins [facility]    Office Visit

## 2023-11-15 ENCOUNTER — IMMUNIZATION (OUTPATIENT)
Dept: FAMILY MEDICINE CLINIC | Facility: CLINIC | Age: 88
End: 2023-11-15

## 2023-11-15 ENCOUNTER — TELEPHONE (OUTPATIENT)
Dept: FAMILY MEDICINE CLINIC | Facility: CLINIC | Age: 88
End: 2023-11-15

## 2023-11-15 DIAGNOSIS — Z23 NEED FOR VACCINATION: Primary | ICD-10-CM

## 2023-11-15 PROCEDURE — 90662 IIV NO PRSV INCREASED AG IM: CPT | Performed by: FAMILY MEDICINE

## 2023-11-15 PROCEDURE — G0008 ADMIN INFLUENZA VIRUS VAC: HCPCS | Performed by: FAMILY MEDICINE

## 2023-11-15 NOTE — TELEPHONE ENCOUNTER
Please let her know it is highly unlikely that valsartan HCT is causing no side effects. Please schedule a follow-up appointment with me within the next 3 weeks, okay to use res24. In the meantime I recommend she hold simvastatin which is more likely to cause these side effects. At appointment we will discuss further.

## 2023-11-15 NOTE — TELEPHONE ENCOUNTER
Contacted patient (name and  of patient verified). She states she is having trouble hearing, requests that RN contact son Rubens Mcwilliams. Contacted Pat (name and  of patient verified). Dr. Emma Smith message reviewed, Urvashi Khalil understanding.  Appointment scheduled:  Future Appointments   Date Time Provider Ariela Carrasco   2023 10:45 AM Keyshawn Ramirez MD [address] [place] [address]

## 2023-11-15 NOTE — TELEPHONE ENCOUNTER
Lucien Rizvi had a nurse visit today in the [facility]. She dropped off a Personalized Education Printout that she received from [facility] regarding her Valsartan-hydrochlorothiazide 80-12.5mg Tablet. It stated that if she were experiencing any side effects that she should let her primary doctor know. Mcgrawcastro Rizvi states that she has lightheadedness, lack of energy, muscle weakness and cramps. Please advise Lucien Rizvi if any changes should be made. The printout is in Dr. Bipin Rodriguez.

## 2023-11-15 NOTE — TELEPHONE ENCOUNTER
Patient stated that she got a sheet from the pharmacy regarding her valsartan/hydrochlorothiazide. Patient stated that the last 6 months has been feeling tired all the time, getting on and off cramps in her legs and hands at night, gets headache, sometimes dizzy. Asked patient if her blood pressure has been ok. She stated that goes up and down, but was fine at last visit, so hasn't really been checking it. 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 Stated not to get old. Patient stated that she mentioned symptoms in the past to Dr Lopez Galvez. Stated that her symptoms are listed as side effects of the blood pressure medication. Wanted to verify if medication needs to be changed. Please advise. Advised patient that if symptoms continue or get worse to give us a call back and schedule an appointment with Dr Lopez Galvez. Patient agreed.

## 2023-12-01 ENCOUNTER — LAB ENCOUNTER (OUTPATIENT)
Dept: LAB | Age: 88
End: 2023-12-01
Attending: FAMILY MEDICINE
Payer: MEDICARE

## 2023-12-01 ENCOUNTER — OFFICE VISIT (OUTPATIENT)
Dept: FAMILY MEDICINE CLINIC | Facility: CLINIC | Age: 88
End: 2023-12-01

## 2023-12-01 ENCOUNTER — OFFICE VISIT (OUTPATIENT)
Dept: SLEEP CENTER | Age: 88
End: 2023-12-01
Attending: FAMILY MEDICINE
Payer: MEDICARE

## 2023-12-01 VITALS
BODY MASS INDEX: 37.32 KG/M2 | HEIGHT: 57 IN | WEIGHT: 173 LBS | DIASTOLIC BLOOD PRESSURE: 84 MMHG | SYSTOLIC BLOOD PRESSURE: 130 MMHG | TEMPERATURE: 98 F | HEART RATE: 71 BPM

## 2023-12-01 DIAGNOSIS — I10 HYPERTENSION, BENIGN: ICD-10-CM

## 2023-12-01 DIAGNOSIS — R53.83 OTHER FATIGUE: ICD-10-CM

## 2023-12-01 DIAGNOSIS — R40.0 SOMNOLENCE: ICD-10-CM

## 2023-12-01 DIAGNOSIS — R40.0 SOMNOLENCE: Primary | ICD-10-CM

## 2023-12-01 DIAGNOSIS — E78.2 MIXED HYPERLIPIDEMIA: ICD-10-CM

## 2023-12-01 DIAGNOSIS — E83.52 HYPERCALCEMIA: ICD-10-CM

## 2023-12-01 LAB
ALBUMIN SERPL-MCNC: 4.5 G/DL (ref 3.2–4.8)
ALBUMIN/GLOB SERPL: 1.6 {RATIO} (ref 1–2)
ALP LIVER SERPL-CCNC: 69 U/L
ALT SERPL-CCNC: 14 U/L
ANION GAP SERPL CALC-SCNC: 8 MMOL/L (ref 0–18)
AST SERPL-CCNC: 22 U/L (ref ?–34)
BILIRUB SERPL-MCNC: 0.6 MG/DL (ref 0.2–1.1)
BUN BLD-MCNC: 26 MG/DL (ref 9–23)
BUN/CREAT SERPL: 26 (ref 10–20)
CALCIUM BLD-MCNC: 10.7 MG/DL (ref 8.7–10.4)
CHLORIDE SERPL-SCNC: 104 MMOL/L (ref 98–112)
CHOLEST SERPL-MCNC: 231 MG/DL (ref ?–200)
CO2 SERPL-SCNC: 27 MMOL/L (ref 21–32)
CREAT BLD-MCNC: 1 MG/DL
DEPRECATED RDW RBC AUTO: 44.4 FL (ref 35.1–46.3)
EGFRCR SERPLBLD CKD-EPI 2021: 55 ML/MIN/1.73M2 (ref 60–?)
ERYTHROCYTE [DISTWIDTH] IN BLOOD BY AUTOMATED COUNT: 13.8 % (ref 11–15)
ERYTHROCYTE [SEDIMENTATION RATE] IN BLOOD: 45 MM/HR
FASTING PATIENT LIPID ANSWER: YES
FASTING STATUS PATIENT QL REPORTED: YES
GLOBULIN PLAS-MCNC: 2.8 G/DL (ref 2.8–4.4)
GLUCOSE BLD-MCNC: 120 MG/DL (ref 70–99)
HCT VFR BLD AUTO: 43.1 %
HDLC SERPL-MCNC: 60 MG/DL (ref 40–59)
HGB BLD-MCNC: 13.9 G/DL
LDLC SERPL CALC-MCNC: 149 MG/DL (ref ?–100)
MCH RBC QN AUTO: 28.2 PG (ref 26–34)
MCHC RBC AUTO-ENTMCNC: 32.3 G/DL (ref 31–37)
MCV RBC AUTO: 87.4 FL
NONHDLC SERPL-MCNC: 171 MG/DL (ref ?–130)
OSMOLALITY SERPL CALC.SUM OF ELEC: 294 MOSM/KG (ref 275–295)
PLATELET # BLD AUTO: 246 10(3)UL (ref 150–450)
POTASSIUM SERPL-SCNC: 4.2 MMOL/L (ref 3.5–5.1)
PROT SERPL-MCNC: 7.3 G/DL (ref 5.7–8.2)
RBC # BLD AUTO: 4.93 X10(6)UL
SODIUM SERPL-SCNC: 139 MMOL/L (ref 136–145)
TRIGL SERPL-MCNC: 125 MG/DL (ref 30–149)
VLDLC SERPL CALC-MCNC: 24 MG/DL (ref 0–30)
WBC # BLD AUTO: 4.9 X10(3) UL (ref 4–11)

## 2023-12-01 PROCEDURE — 80053 COMPREHEN METABOLIC PANEL: CPT

## 2023-12-01 PROCEDURE — 95810 POLYSOM 6/> YRS 4/> PARAM: CPT

## 2023-12-01 PROCEDURE — 85027 COMPLETE CBC AUTOMATED: CPT

## 2023-12-01 PROCEDURE — 99213 OFFICE O/P EST LOW 20 MIN: CPT | Performed by: FAMILY MEDICINE

## 2023-12-01 PROCEDURE — 1126F AMNT PAIN NOTED NONE PRSNT: CPT | Performed by: FAMILY MEDICINE

## 2023-12-01 PROCEDURE — 36415 COLL VENOUS BLD VENIPUNCTURE: CPT

## 2023-12-01 PROCEDURE — 80061 LIPID PANEL: CPT

## 2023-12-01 PROCEDURE — 85652 RBC SED RATE AUTOMATED: CPT

## 2023-12-01 NOTE — PROGRESS NOTES
Subjective:   Patient ID: Remi Paget is a 82year old female. Patient presents for evaluation of tiredness and fatigue as below. No better with holding simvastatin. Medication Follow-Up        History/Other:   Review of Systems  Current Outpatient Medications   Medication Sig Dispense Refill    aspirin (ASPIRIN LOW DOSE) 81 MG Oral Tab EC Take 1 tablet (81 mg total) by mouth daily. 90 tablet 3    DULoxetine 30 MG Oral Cap DR Particles Take 1 capsule (30 mg total) by mouth daily. 90 capsule 3    alendronate (FOSAMAX) 70 MG Oral Tab Take 1 tablet (70 mg total) by mouth every 7 days. 12 tablet 3    VALSARTAN-HYDROCHLOROTHIAZIDE 80-12.5 MG Oral Tab TAKE 1 TABLET BY MOUTH EVERY DAY 90 tablet 3    Ascorbic Acid (VITAMIN C) 100 MG Oral Tab Take 1 tablet (100 mg total) by mouth daily. Turmeric 400 MG Oral Cap Take 1 capsule by mouth 3 (three) times daily. 90 capsule 0    Calcium 500 MG Oral Tab Take  by mouth. simvastatin 20 MG Oral Tab Take 1 tablet (20 mg total) by mouth nightly. (Patient not taking: Reported on 12/1/2023) 90 tablet 3    Betahistine HCl (BETAHISTINE DIHYDROCHLORIDE) Does not apply Powder Take 16 mg by mouth in the morning, at noon, and at bedtime. Po TID per ENT 16 MG CAPSULE. Use [facility] 270 each 1    triamcinolone 0.1 % External Cream Apply topically 2 (two) times daily as needed. 60 g 3     Allergies:No Known Allergies    Objective:   Physical Exam  Constitutional:       Appearance: Normal appearance. Cardiovascular:      Rate and Rhythm: Normal rate and regular rhythm. Pulses: Normal pulses. Heart sounds: Normal heart sounds. Pulmonary:      Effort: Pulmonary effort is normal.      Breath sounds: Normal breath sounds. Musculoskeletal:      Right lower leg: No edema. Left lower leg: No edema. Neurological:      Mental Status: She is alert. Assessment & Plan:   1. Somnolence-patient experiencing tiredness all day. Nonrestful sleep.   When she awakens from sleep in the morning she hears a loud grinding noise until she opens her eyes. No fever, chills. She held her simvastatin for 3 weeks to see whether this improved symptoms, but not better. At this time plan to check labs as ordered, sleep study. If normal consider EKG/stress test.   2. Other fatigue-as above   3. Hypertension, benign-blood pressure controlled on current medication   4. Mixed hyperlipidemia-check labs as ordered. Orders Placed This Encounter   Procedures    Comp Metabolic Panel (14)    Lipid Panel    CBC, Platelet;  No Differential    Sed Rate, Westergren (Automated) [E]       Meds This Visit:  Requested Prescriptions      No prescriptions requested or ordered in this encounter       Imaging & Referrals:  [facility] ALT REFERRAL DIAGOSTIC SLEEP STUDY ADULT

## 2023-12-04 DIAGNOSIS — G47.33 OSA (OBSTRUCTIVE SLEEP APNEA): Primary | ICD-10-CM

## 2023-12-05 ENCOUNTER — LAB ENCOUNTER (OUTPATIENT)
Dept: LAB | Age: 88
End: 2023-12-05
Attending: FAMILY MEDICINE
Payer: MEDICARE

## 2023-12-05 ENCOUNTER — OFFICE VISIT (OUTPATIENT)
Dept: SLEEP CENTER | Age: 88
End: 2023-12-05
Attending: FAMILY MEDICINE
Payer: MEDICARE

## 2023-12-05 DIAGNOSIS — E83.52 HYPERCALCEMIA: ICD-10-CM

## 2023-12-05 DIAGNOSIS — G47.33 OSA (OBSTRUCTIVE SLEEP APNEA): Primary | ICD-10-CM

## 2023-12-05 LAB — PTH-INTACT SERPL-MCNC: 93 PG/ML (ref 18.5–88)

## 2023-12-05 PROCEDURE — 95811 POLYSOM 6/>YRS CPAP 4/> PARM: CPT

## 2023-12-05 PROCEDURE — 83970 ASSAY OF PARATHORMONE: CPT

## 2023-12-05 PROCEDURE — 36415 COLL VENOUS BLD VENIPUNCTURE: CPT

## 2023-12-06 ENCOUNTER — TELEPHONE (OUTPATIENT)
Dept: ENDOCRINOLOGY CLINIC | Facility: CLINIC | Age: 88
End: 2023-12-06

## 2023-12-06 DIAGNOSIS — R79.89 ELEVATED PTHRP LEVEL: ICD-10-CM

## 2023-12-06 DIAGNOSIS — E83.52 HYPERCALCEMIA: Primary | ICD-10-CM

## 2023-12-06 NOTE — TELEPHONE ENCOUNTER
Per PCP request please add on at OP/ [facility]  in the next 6-8 weeks  Can add on at lunch   Thanks

## 2023-12-06 NOTE — TELEPHONE ENCOUNTER
----- Message from Paty Waldrop MD sent at 12/6/2023  2:03 PM CST -----  Could you please have your staff call this patient and schedule her within the next 6 weeks. Her calcium has been inching upwards, slightly high PTH. I just started her on alendronate 1/2023 for osteopenia with high risk of fracture. Thanks!

## 2023-12-08 ENCOUNTER — ORDER TRANSCRIPTION (OUTPATIENT)
Dept: SLEEP CENTER | Age: 88
End: 2023-12-08

## 2023-12-08 DIAGNOSIS — G47.33 OBSTRUCTIVE SLEEP APNEA (ADULT) (PEDIATRIC): Primary | ICD-10-CM

## 2023-12-11 NOTE — TELEPHONE ENCOUNTER
Pt scheduled 1/30 at 3;30 Pt sts she has no hx of + PPD, no night sweats, no loss of weight no hx of traveling risk of TB is low. Dr. Mackay is notified and sts call Dr. Ramirez to admit pt for HIP insurance Dr. Ramirez is notified and admit pt

## 2023-12-13 ENCOUNTER — TELEPHONE (OUTPATIENT)
Dept: FAMILY MEDICINE CLINIC | Facility: CLINIC | Age: 88
End: 2023-12-13

## 2023-12-13 DIAGNOSIS — G47.33 OSA (OBSTRUCTIVE SLEEP APNEA): Primary | ICD-10-CM

## 2023-12-13 NOTE — TELEPHONE ENCOUNTER
Please contact patient and let her know order has been done for CPAP equipment and we will send to [facility]. She should let us know if she does not hear from them within the next week. Check to see whether patient has smart phone, if not please inform [facility] they will need to send downloaded data to me after 1 month of use. Explained to patient she should see me after 1 month of use, and it is required that she use the equipment a minimum of 4 hours per night on most nights in order to have continued coverage. Please send to [facility] along with last note, sleep study and CPAP titration.

## 2023-12-15 NOTE — TELEPHONE ENCOUNTER
Spoke with patient regarding provider's recommendations. Patient verbalized understanding. Triage support please help with the following: Please send to [facility] along with last note, sleep study and CPAP titration.

## 2023-12-15 NOTE — TELEPHONE ENCOUNTER
[facility]  Phone [phone]  Fax [phone]    RN faxed Last office visit note, Sleep Study and CPAP titration study to [facility]. Sleep study and CPAP titration study were printed and manually faxed at [facility]. Faxed confirmations received for all documents.

## 2023-12-22 NOTE — TELEPHONE ENCOUNTER
Refill passed per [facility] protocol. Requested Prescriptions   Pending Prescriptions Disp Refills    ALENDRONATE 70 MG Oral Tab [Pharmacy Med Name: ALENDRONATE SODIUM 70 MG TAB] 12 tablet 3     Sig: Take 1 tablet (70 mg total) by mouth every 7 days.        Osteoporosis Medication Protocol Passed - 12/21/2023 12:21 AM        Passed - In person appointment or virtual visit in the past 12 mos or appointment in next 3 mos     Recent Outpatient Visits              2 weeks ago RUFINO (obstructive sleep apnea)    [address] [facility]    Office Visit    2 weeks ago Somnolence    [address] [facility]    Office Visit    2 weeks ago Somnolence    [address] [facility]    Office Visit    7 months ago Routine physical examination    [address] [facility], [place] Enrique Fleming MD    Office Visit    9 months ago Osteoarthritis of left knee, unspecified osteoarthritis type    [address], [facility], [place] Enrique Fleming MD    Office Visit          Future Appointments         Provider Department Appt Notes    In 1 month Rose Gaviria MD [address], [address] [facility], [place], [facility]     In 1 month Enrique Felming MD [address] [facility], [place] 2 month f/u

## 2023-12-23 RX ORDER — ALENDRONATE SODIUM 70 MG/1
70 TABLET ORAL
Qty: 12 TABLET | Refills: 3 | Status: SHIPPED | OUTPATIENT
Start: 2023-12-23

## 2024-01-18 RX ORDER — VALSARTAN AND HYDROCHLOROTHIAZIDE 80; 12.5 MG/1; MG/1
1 TABLET, FILM COATED ORAL DAILY
Qty: 90 TABLET | Refills: 3 | Status: SHIPPED | OUTPATIENT
Start: 2024-01-18

## 2024-01-30 ENCOUNTER — LAB ENCOUNTER (OUTPATIENT)
Dept: LAB | Facility: HOSPITAL | Age: 89
End: 2024-01-30
Attending: INTERNAL MEDICINE
Payer: MEDICARE

## 2024-01-30 ENCOUNTER — OFFICE VISIT (OUTPATIENT)
Dept: ENDOCRINOLOGY CLINIC | Facility: CLINIC | Age: 89
End: 2024-01-30

## 2024-01-30 ENCOUNTER — TELEPHONE (OUTPATIENT)
Dept: ENDOCRINOLOGY CLINIC | Facility: CLINIC | Age: 89
End: 2024-01-30

## 2024-01-30 VITALS
WEIGHT: 179 LBS | SYSTOLIC BLOOD PRESSURE: 152 MMHG | DIASTOLIC BLOOD PRESSURE: 82 MMHG | HEART RATE: 67 BPM | BODY MASS INDEX: 38.62 KG/M2 | HEIGHT: 57 IN

## 2024-01-30 DIAGNOSIS — E21.3 HYPERPARATHYROIDISM (HCC): ICD-10-CM

## 2024-01-30 DIAGNOSIS — E55.9 VITAMIN D DEFICIENCY: ICD-10-CM

## 2024-01-30 DIAGNOSIS — E21.3 HYPERPARATHYROIDISM (HCC): Primary | ICD-10-CM

## 2024-01-30 DIAGNOSIS — E55.9 VITAMIN D DEFICIENCY: Primary | ICD-10-CM

## 2024-01-30 PROBLEM — E21.0 PRIMARY HYPERPARATHYROIDISM (HCC): Status: ACTIVE | Noted: 2024-01-30

## 2024-01-30 LAB
CALCIUM BLD-MCNC: 9.8 MG/DL (ref 8.7–10.4)
VIT D+METAB SERPL-MCNC: 48.4 NG/ML (ref 30–100)

## 2024-01-30 PROCEDURE — 99203 OFFICE O/P NEW LOW 30 MIN: CPT | Performed by: INTERNAL MEDICINE

## 2024-01-30 PROCEDURE — 82310 ASSAY OF CALCIUM: CPT

## 2024-01-30 PROCEDURE — 36415 COLL VENOUS BLD VENIPUNCTURE: CPT

## 2024-01-30 PROCEDURE — 82306 VITAMIN D 25 HYDROXY: CPT

## 2024-01-30 NOTE — PATIENT INSTRUCTIONS
There is a hormone in the body called the parathyroid hormone that regulates calcium   Your parathyroid hormone level is high which is raising your calcium   It takes out the calcium from the bones and puts it in the blood  Hence your bones get weak and calcium in your blood goes up   When calcium in your blood goes up it comes out in the urine causing kidney stones        There are glands in the neck called the parathyroid glands  They make a hormone called the parathyroid hormone, also called PTH  Your PTH level is high. And this high PTH is raising your calcium levels  High calcium can cause:   Loss of appetite.  Nausea and vomiting.  Constipation and abdominal (belly) pain.  The need to drink more fluids and urinate more.  Tiredness, weakness, or muscle pain.  Confusion, disorientation, and difficulty thinking.  Headaches.  Depression.  High PTH can also cause bone loss in addition to raising calcium     The treatment is surgical removal of the parathyroid gland  Given your age, like we discussed, we will try to avoid surgery We will monitor labs for now  Hence, for now, we will do some testing   - We will repeat your calcium ,  and vitamin D      We will call you with the results and further recommendations       Drink lot of water  Stay active  Do not take any calcium pills  Continue fosamax this will help your bones stay strong     Anjelica Graham

## 2024-01-30 NOTE — H&P
New Patient Evaluation - History and Physical    CONSULT - Reason for Visit:  Hypercalcemia  Requesting Physician: Dr. Pierre     CHIEF COMPLAINT:    Chief Complaint   Patient presents with    Consult     Patient was referred by PCP to seek care with Endocrinology due to Hypercalcemia.        HISTORY OF PRESENT ILLNESS:   Kalani Reyes is a 88 year old female who presents for evaluation of hypercalcemia  Noted on labs  Highest calcium 10.7.albumin 4.5  PTH elevated  Vitamin D not available  GFr normal    DXA 2023: osteopenia . Started on fosamax about a year go  No falls, no fractures    Calcium supplements/ Vitamin D supplements: was taking OTC calcium and vitamin D, stopped after lab results in dec 2023    PAST MEDICAL HISTORY:   Past Medical History:   Diagnosis Date    DJD (degenerative joint disease) of knee     DJD knees    High blood pressure     High cholesterol     History of colonic polyps 10/23/2015    10/2015, small polypectomy.  Consider repeat 2020, though difficult exam    Obesity, unspecified     Osteoarthritis     Osteopenia 2002, 2006, 2012    BMD 2006 T -1.7, 2012 -1.6    Osteoporosis 1999    osteoporosis lumbar spine    Primary osteoarthritis involving multiple joints 8/28/2015    Unspecified essential hypertension        PAST SURGICAL HISTORY:   No past surgical history on file.    CURRENT MEDICATIONS:    Current Outpatient Medications   Medication Sig Dispense Refill    valsartan-hydroCHLOROthiazide 80-12.5 MG Oral Tab Take 1 tablet by mouth daily. 90 tablet 3    alendronate 70 MG Oral Tab Take 1 tablet (70 mg total) by mouth every 7 days. 12 tablet 3    aspirin (ASPIRIN LOW DOSE) 81 MG Oral Tab EC Take 1 tablet (81 mg total) by mouth daily. 90 tablet 3    DULoxetine 30 MG Oral Cap DR Particles Take 1 capsule (30 mg total) by mouth daily. 90 capsule 3    Betahistine HCl (BETAHISTINE DIHYDROCHLORIDE) Does not apply Powder Take 16 mg by mouth in the morning, at noon, and at bedtime. Po TID per  ENT 16 MG CAPSULE. Use [employer] 270 each 1    triamcinolone 0.1 % External Cream Apply topically 2 (two) times daily as needed. 60 g 3    Ascorbic Acid (VITAMIN C) 100 MG Oral Tab Take 1 tablet (100 mg total) by mouth daily.      Turmeric 400 MG Oral Cap Take 1 capsule by mouth 3 (three) times daily. 90 capsule 0    Calcium 500 MG Oral Tab Take  by mouth.         ALLERGIES:  No Known Allergies    SOCIAL HISTORY:    Social History     Socioeconomic History    Marital status: Single   Tobacco Use    Smoking status: Never    Smokeless tobacco: Never   Vaping Use    Vaping Use: Never used   Substance and Sexual Activity    Alcohol use: No     Alcohol/week: 0.0 standard drinks of alcohol     Comment: occasionally    Drug use: No       FAMILY HISTORY:   Family History   Problem Relation Age of Onset    Other (Other) Father     Other (Other) Mother        ASSESSMENTS:     REVIEW OF SYSTEMS:  Constitutional: Negative for: weight change, fever, fatigue, cold/heat intolerance  Eyes: Negative for:  Visual changes, proptosis, blurring  ENT: Negative for:  dysphagia, neck swelling, dysphonia  Respiratory: Negative for:  dyspnea, cough  Cardiovascular: Negative for:  chest pain, palpitations, orthopnea  GI: Negative for:  abdominal pain, nausea, vomiting, diarrhea, constipation, bleeding  Neurology: Negative for: headache, numbness, weakness  Genito-Urinary: Negative for: dysuria, frequency  Psychiatric: Negative for:  depression, anxiety  Hematology/Lymphatics: Negative for: bruising, lower extremity edema  Endocrine: Negative for: polyuria, polydypsia  Skin: Negative for: rash, blister, cellulitis,      PHYSICAL EXAM:   Vitals:    01/30/24 1531 01/30/24 1601   BP: (!) 169/76 152/82   Pulse: 67    Weight: 179 lb (81.2 kg)    Height: 4' 9\" (1.448 m)      BMI: Body mass index is 38.74 kg/m².         General Appearance:  alert, well developed, in no acute distress  Head: Atraumatic  Eyes:  normal conjunctivae, sclera.,  normal sclera and normal pupils  Throat/Neck: normal sound to voice. Normal hearing, normal speech  Respiratory:  Speaking in full sentences, non-labored. no increased work of breathing, no audible wheezing    Neuro: motor grossly intact, moving all extremities without difficulty  Psychiatric:  oriented to time, self, and place  Extremities: no obvious extremity swelling, no lesions        DATA:     Pertinent data reviewed      ASSESSMENT AND PLAN:    Patient is a 88 year old with female with primary hyperparathyroidism.     I reviewed the following with her:     - Calcium regulation  - Role of PTH and vitamin D in calcium regulation  - Primary hyperparathyroidism including causes adenoma vs hyperplasia  - Indications for treatment of hyperparathyroidism ( NM parathyroid scan to localize source following by surgery)  > Symptoms of hypercalcemia  > CKD  > Osteoporosis  > Renal stones / high urine calcium:   > patient preference  > Age under 60  > Serum calcium > 1 x ULN    She does meet some criteria for surgery, but given age will like to avoid it       Discussed monitoring with calcium, PTH , Vitamin D  Reviewed symptoms of renal stones. Patient instructed to avoid factors that cam exacerbate hypercalcemia including thiazide diuretics, dehydration, prolonged bed rest.        Instructed that she shouldn't restrict calcium in her diet, since low calcium levels can increase PTH levels which could cause bone loss. Discussed RDI.    PLAN:  1. Calcium, vitamin D   2 To call if she has symptoms of hypercalcemia  3. C/w fosamax 70 mg weekly, tolerating well without SE< prescribed by Dr. Pierre     BP is elevated . Low salt diet monitor at home FU with PCP         Orders Placed This Encounter   Procedures    Calcium    Vitamin D [E]         1/30/2024  Anjelica Graham MD        Patient verbalized a complete  understanding of all of the above and did not have any further questions.

## 2024-01-31 NOTE — TELEPHONE ENCOUNTER
Vitamin D  is normal  After stopping the calcium supplement , calcium is normal now  Please continue to hold calcium and vitamin D supplementation   We will monitir labs  Please repeat calcium in three months  Please call if she develops symptoms of high calcium that I wrote down for her at her visut on 1/30/24  Thanks

## 2024-02-02 ENCOUNTER — TELEPHONE (OUTPATIENT)
Dept: FAMILY MEDICINE CLINIC | Facility: CLINIC | Age: 89
End: 2024-02-02

## 2024-02-02 ENCOUNTER — OFFICE VISIT (OUTPATIENT)
Dept: FAMILY MEDICINE CLINIC | Facility: CLINIC | Age: 89
End: 2024-02-02

## 2024-02-02 VITALS
BODY MASS INDEX: 38 KG/M2 | TEMPERATURE: 98 F | SYSTOLIC BLOOD PRESSURE: 130 MMHG | WEIGHT: 177 LBS | HEART RATE: 80 BPM | DIASTOLIC BLOOD PRESSURE: 80 MMHG

## 2024-02-02 DIAGNOSIS — G47.33 OSA ON CPAP: ICD-10-CM

## 2024-02-02 DIAGNOSIS — I10 ESSENTIAL HYPERTENSION: Primary | ICD-10-CM

## 2024-02-02 DIAGNOSIS — E21.0 PRIMARY HYPERPARATHYROIDISM (HCC): ICD-10-CM

## 2024-02-02 PROCEDURE — 99213 OFFICE O/P EST LOW 20 MIN: CPT | Performed by: FAMILY MEDICINE

## 2024-02-02 RX ORDER — CHOLECALCIFEROL (VITAMIN D3) 25 MCG
1000 CAPSULE ORAL DAILY
Qty: 90 CAPSULE | Refills: 3 | Status: SHIPPED | OUTPATIENT
Start: 2024-02-02 | End: 2024-03-03

## 2024-02-02 NOTE — TELEPHONE ENCOUNTER
Called [facility] to obtain downloaded data of patient's CPAP.  Provided fax number and it will be sent to this RN attention.    [facility] reports there are quite a few leaks noted in the report.  They suggest patient come to the physical store to see if they can find pieces that will fit better.  [facility]  [address] [place] [address]    Dr. Pierre, data received.  I will put in your file bin on your desk.  Let me know if you want patient to go to [facility] to get mask to fit better.

## 2024-02-02 NOTE — PROGRESS NOTES
Resection by Dr. Jay September 2018   Subjective:   Patient ID: Kalani Reyes is a 88 year old female.    Patient presents to follow-up on hypertension and CPAP treatment as below.        History/Other:   Review of Systems  Current Outpatient Medications   Medication Sig Dispense Refill    Cholecalciferol (VITAMIN D-3) 25 MCG (1000 UT) Oral Cap Take 1,000 Int'l Units by mouth daily. 90 capsule 3    valsartan-hydroCHLOROthiazide 80-12.5 MG Oral Tab Take 1 tablet by mouth daily. 90 tablet 3    alendronate 70 MG Oral Tab Take 1 tablet (70 mg total) by mouth every 7 days. 12 tablet 3    aspirin (ASPIRIN LOW DOSE) 81 MG Oral Tab EC Take 1 tablet (81 mg total) by mouth daily. 90 tablet 3    DULoxetine 30 MG Oral Cap DR Particles Take 1 capsule (30 mg total) by mouth daily. 90 capsule 3    triamcinolone 0.1 % External Cream Apply topically 2 (two) times daily as needed. 60 g 3    Ascorbic Acid (VITAMIN C) 100 MG Oral Tab Take 1 tablet (100 mg total) by mouth daily.      Turmeric 400 MG Oral Cap Take 1 capsule by mouth 3 (three) times daily. 90 capsule 0    Betahistine HCl (BETAHISTINE DIHYDROCHLORIDE) Does not apply Powder Take 16 mg by mouth in the morning, at noon, and at bedtime. Po TID per ENT 16 MG CAPSULE. Use [employer] 270 each 1    Calcium 500 MG Oral Tab Take  by mouth.       Allergies:No Known Allergies    Objective:   Physical Exam  Constitutional:       Appearance: Normal appearance.   Cardiovascular:      Rate and Rhythm: Normal rate and regular rhythm.      Pulses: Normal pulses.      Heart sounds: Normal heart sounds.   Pulmonary:      Effort: Pulmonary effort is normal.      Breath sounds: Normal breath sounds.   Musculoskeletal:      Right lower leg: No edema.      Left lower leg: No edema.   Neurological:      Mental Status: She is alert.         Assessment & Plan:   1. Essential hypertension-blood pressure controlled on current medication.  Follow-up here in 3 months.  Recheck BMP, hemoglobin A1c at that time.   2. RUFINO on CPAP-started  CPAP.  Some difficulty going to sleep at night.  She does use it from about 4 AM to 9 AM consistently.  She does feel some improvement in fatigue.  Plan to contact CPAP supplier regarding additional strap.  Also downloaded data.  Downloaded data received: Using greater than 4 hours 48% of nights, average usage on days used 3 hours and 58 minutes high average leakage.   3. Primary hyperparathyroidism (HCC)-followed by Dr. Graham.  Monitoring at this time.  Discontinuing calcium supplements but taking vitamin D 1000 IU daily.       No orders of the defined types were placed in this encounter.      Meds This Visit:  Requested Prescriptions     Signed Prescriptions Disp Refills    Cholecalciferol (VITAMIN D-3) 25 MCG (1000 UT) Oral Cap 90 capsule 3     Sig: Take 1,000 Int'l Units by mouth daily.       Imaging & Referrals:  None

## 2024-02-02 NOTE — TELEPHONE ENCOUNTER
Spoke to patient to relay message below - patient stated understanding and will continue to hold supplements and repeat lab in 3 months (beginning of May)  Lab ordered   Patient stated understanding to call if experiencing symptoms of high calcium

## 2024-02-05 NOTE — TELEPHONE ENCOUNTER
Called patient, confirmed name and .    Patient advised that she needs a better fitting mask.  Provided address and phone number.  Patient is hoping they can come to her and she will call them.

## 2024-02-15 ENCOUNTER — TELEPHONE (OUTPATIENT)
Dept: FAMILY MEDICINE CLINIC | Facility: CLINIC | Age: 89
End: 2024-02-15

## 2024-02-15 NOTE — TELEPHONE ENCOUNTER
Patient calling to state received mask, stated insurance wants to know if masking is working for patient in order to have it paid. Patient stated it is working,

## 2024-03-03 NOTE — TELEPHONE ENCOUNTER
Problem: Adult Behavioral Health Plan of Care  Goal: Patient-Specific Goal (Individualization)  Description: Patient will sleep 6 to 8 hours per night  Patient will eat at least 50% of meals  Patient will attend at least 50% of groups  Patient will comply with recommendations of treatment team  Patient will remain medication compliant    3/2/24- Pt may wean to open unit, if behaviors are appropriate, during time of decreased stimulus. Treatment team to assess daily.     3/2/2024 2309 by Charla Castro RN  Outcome: Progressing  Note: Received end of shift report from LIGIA Goff. Pt up watching television in MH-ICU;  ambulates in MH-ICU, staff frequently encouraged pt to come out onto open unit. Ate 100% of chicken burger. Denies need for medication adminsitration. Derealization continues, mild paranoia. Full range affect, mood is calm/anxious. Restless, withdrawn activity. Returned to room at approximately 22:30 for bed.     Face to face end of shift report to be communicated to on-coming [facility] staff.     Charla Castro RN  3/2/2024  11:12 PM           Problem: Thought Process Alteration  Goal: Optimal Thought Clarity  Description: Pt will be able to have a reality based conversation prior to discharge.    3/2/2024 2309 by Charla Castro RN  Outcome: Progressing     Problem: Adult Behavioral Health Plan of Care  Goal: Plan of Care Review  Recent Flowsheet Documentation  Taken 3/2/2024 1600 by Charla Castro RN  Patient Agreement with Plan of Care: agrees with comment (describe)     Problem: Adult Behavioral Health Plan of Care  Goal: Develops/Participates in [place] [place] to Support Successful Transition  Intervention: Foster Therapeutic [place]  Recent Flowsheet Documentation  Taken 3/2/2024 1600 by Charla Castro RN  Trust Relationship/Rapport:   care explained   choices provided   emotional support provided   empathic listening provided   thoughts/feelings acknowledged   reassurance  Disability Placard dropped off at [place], no further action required. provided   questions encouraged   Goal Outcome Evaluation:    Plan of Care Reviewed With: patient

## 2024-03-24 RX ORDER — DULOXETIN HYDROCHLORIDE 30 MG/1
30 CAPSULE, DELAYED RELEASE ORAL DAILY
Qty: 90 CAPSULE | Refills: 3 | Status: SHIPPED | OUTPATIENT
Start: 2024-03-24

## 2024-03-24 NOTE — TELEPHONE ENCOUNTER
Refill passed per [facility] protocol.    Requested Prescriptions   Pending Prescriptions Disp Refills    DULOXETINE 30 MG Oral Cap DR Particles [Pharmacy Med Name: DULOXETINE HCL DR 30 MG CAP] 90 capsule 3     Sig: TAKE 1 CAPSULE BY MOUTH EVERY DAY       Psychiatric Non-Scheduled (Anti-Anxiety) Passed - 3/24/2024  7:24 AM        Passed - In person appointment or virtual visit in the past 6 mos or appointment in next 3 mos     Recent Outpatient Visits              1 month ago Essential hypertension    [facility] ([place], [place]) Shayla Pierre MD    Office Visit    1 month ago Vitamin D deficiency    [facility], Anjelica Westfall MD    Office Visit    3 months ago RUFINO (obstructive sleep apnea)    [facility]    Office Visit    3 months ago [facility]    Office Visit    3 months ago [facility] Shayla Pierre MD    Office Visit          Future Appointments         Provider Department Appt Notes    In 1 month Shayla Pierre MD [facility] ([place], [place]) annual Medicare wellness exam               Passed - Depression Screening completed within the past 12 months             Future Appointments         Provider Department Appt Notes    In 1 month Shayla Pierre MD [facility] ([place], [place]) annual Medicare wellness exam            Recent Outpatient Visits              1 month ago Essential hypertension    [facility] ([place], [place]) Shayla Pierre MD    Office Visit    1 month ago Vitamin D deficiency    [facility], Anjelica Westfall MD    Office Visit    3 months ago RUFINO (obstructive sleep apnea)    [facility]    Office Visit    [facility]    Office  Visit    [facility]    [facility] Shayla Pierre MD    Office Visit

## 2024-04-30 ENCOUNTER — OFFICE VISIT (OUTPATIENT)
Dept: FAMILY MEDICINE CLINIC | Facility: CLINIC | Age: 89
End: 2024-04-30

## 2024-04-30 VITALS
BODY MASS INDEX: 38.73 KG/M2 | HEART RATE: 74 BPM | HEIGHT: 57.5 IN | SYSTOLIC BLOOD PRESSURE: 112 MMHG | DIASTOLIC BLOOD PRESSURE: 74 MMHG | WEIGHT: 182 LBS

## 2024-04-30 DIAGNOSIS — R06.09 DYSPNEA ON EXERTION: ICD-10-CM

## 2024-04-30 DIAGNOSIS — E21.0 PRIMARY HYPERPARATHYROIDISM (HCC): ICD-10-CM

## 2024-04-30 DIAGNOSIS — R73.03 PREDIABETES: ICD-10-CM

## 2024-04-30 DIAGNOSIS — I10 HYPERTENSION, BENIGN: ICD-10-CM

## 2024-04-30 DIAGNOSIS — E66.01 CLASS 2 SEVERE OBESITY DUE TO EXCESS CALORIES WITH SERIOUS COMORBIDITY AND BODY MASS INDEX (BMI) OF 38.0 TO 38.9 IN ADULT (HCC): ICD-10-CM

## 2024-04-30 DIAGNOSIS — N18.31 CHRONIC KIDNEY DISEASE, STAGE 3A (HCC): ICD-10-CM

## 2024-04-30 DIAGNOSIS — Z00.00 ENCOUNTER FOR ANNUAL HEALTH EXAMINATION: Primary | ICD-10-CM

## 2024-04-30 DIAGNOSIS — E78.2 MIXED HYPERLIPIDEMIA: ICD-10-CM

## 2024-04-30 DIAGNOSIS — F33.1 MAJOR DEPRESSIVE DISORDER, RECURRENT, MODERATE (HCC): ICD-10-CM

## 2024-04-30 DIAGNOSIS — H91.13 PRESBYCUSIS OF BOTH EARS: ICD-10-CM

## 2024-04-30 DIAGNOSIS — I44.7 LEFT BUNDLE BRANCH BLOCK (LBBB): ICD-10-CM

## 2024-04-30 DIAGNOSIS — M85.80 OSTEOPENIA, UNSPECIFIED LOCATION: ICD-10-CM

## 2024-04-30 DIAGNOSIS — H81.09 MENIERE'S DISEASE, UNSPECIFIED LATERALITY: ICD-10-CM

## 2024-04-30 PROBLEM — E66.812 CLASS 2 SEVERE OBESITY DUE TO EXCESS CALORIES WITH SERIOUS COMORBIDITY AND BODY MASS INDEX (BMI) OF 38.0 TO 38.9 IN ADULT (HCC): Status: ACTIVE | Noted: 2024-04-30

## 2024-04-30 PROBLEM — E66.812 CLASS 2 SEVERE OBESITY DUE TO EXCESS CALORIES WITH SERIOUS COMORBIDITY AND BODY MASS INDEX (BMI) OF 37.0 TO 37.9 IN ADULT (HCC): Status: RESOLVED | Noted: 2023-04-29 | Resolved: 2024-04-30

## 2024-04-30 PROBLEM — G47.33 OSA (OBSTRUCTIVE SLEEP APNEA): Status: ACTIVE | Noted: 2024-04-30

## 2024-04-30 LAB
ATRIAL RATE: 70 BPM
P AXIS: 21 DEGREES
P-R INTERVAL: 142 MS
Q-T INTERVAL: 440 MS
QRS DURATION: 144 MS
QTC CALCULATION (BEZET): 475 MS
R AXIS: 5 DEGREES
T AXIS: 131 DEGREES
VENTRICULAR RATE: 70 BPM

## 2024-04-30 PROCEDURE — 93000 ELECTROCARDIOGRAM COMPLETE: CPT | Performed by: FAMILY MEDICINE

## 2024-04-30 PROCEDURE — G0439 PPPS, SUBSEQ VISIT: HCPCS | Performed by: FAMILY MEDICINE

## 2024-04-30 NOTE — PROGRESS NOTES
Subjective:   Kalani Reyes is a 89 year old female who presents for a Medicare Subsequent Annual Wellness visit (Pt already had Initial Annual Wellness) and  iissues as below .   Generally feeling well.  Occasional shortness of breath with stairs.  Frustrated with weight gain.    History/Other:   Fall Risk Assessment:   She has been screened for Falls and is low risk.      Cognitive Assessment:   Abnormal  What day of the week is this?: Correct  What month is it?: Correct  What year is it?: Correct  Recall \"Ball\": Correct  Recall \"Flag\": Incorrect  Recall \"Tree\": Correct    Functional Ability/Status:   Kalani Reyes has some abnormal functions as listed below:  She has Driving difficulties based on screening of functional status. She has Hearing problems based on screening of functional status.She has Vision problems based on screening of functional status. She has Walking problems based on screening of functional status. She has problems with Memory based on screening of functional status.       Depression Screening (PHQ-2/PHQ-9): PHQ-2 SCORE: 0  , done 4/30/2024             Advanced Directives:   She does NOT have a Living Will. [Do you have a living will?: No]  She does NOT have a Power of  for Health Care. [Do you have a healthcare power of ?: No]  Patient has Advance Care Planning documents but we do not have a copy in EMR. Discussed Advanced Care Planning with patient and instructed patient to get our office a copy to be scanned into EMR.      Patient Active Problem List   Diagnosis    Hypertension, benign    Mixed hyperlipidemia    Meniere's disease    Osteopenia    Prediabetes    Left bundle branch block (LBBB)    Presbycusis of both ears    Chronic kidney disease, stage 3a (HCC)    Major depressive disorder, recurrent, moderate (HCC)    Primary hyperparathyroidism (HCC)    Class 2 severe obesity due to excess calories with serious comorbidity and body mass index (BMI) of 38.0 to 38.9 in  adult (HCC)    RUFINO (obstructive sleep apnea)     Allergies:  She has No Known Allergies.    Current Medications:  Outpatient Medications Marked as Taking for the 4/30/24 encounter (Office Visit) with Shayla Pierre MD   Medication Sig    DULoxetine 30 MG Oral Cap DR Particles Take 1 capsule (30 mg total) by mouth daily.    valsartan-hydroCHLOROthiazide 80-12.5 MG Oral Tab Take 1 tablet by mouth daily.    alendronate 70 MG Oral Tab Take 1 tablet (70 mg total) by mouth every 7 days.    aspirin (ASPIRIN LOW DOSE) 81 MG Oral Tab EC Take 1 tablet (81 mg total) by mouth daily.    Betahistine HCl (BETAHISTINE DIHYDROCHLORIDE) Does not apply Powder Take 16 mg by mouth in the morning, at noon, and at bedtime. Po TID per ENT 16 MG CAPSULE. Use [facility]    triamcinolone 0.1 % External Cream Apply topically 2 (two) times daily as needed.    Ascorbic Acid (VITAMIN C) 100 MG Oral Tab Take 1 tablet (100 mg total) by mouth daily.    Turmeric 400 MG Oral Cap Take 1 capsule by mouth 3 (three) times daily.    Calcium 500 MG Oral Tab Take  by mouth.       Medical History:  She  has a past medical history of DJD (degenerative joint disease) of knee, High blood pressure, High cholesterol, History of colonic polyps (10/23/2015), Obesity, unspecified, Osteoarthritis, Osteopenia (2002, 2006, 2012), Osteoporosis (1999), Primary osteoarthritis involving multiple joints (8/28/2015), and Unspecified essential hypertension.  Surgical History:  She  has no past surgical history on file.   Family History:  Her family history includes Other in her father and mother.  Social History:  She  reports that she has never smoked. She has never used smokeless tobacco. She reports that she does not drink alcohol and does not use drugs.    Tobacco:  She has never smoked tobacco.    CAGE Alcohol Screen:   CAGE screening score of 0 on 4/30/2024, showing low risk of alcohol abuse.      Patient Care Team:  Shayla Pierre MD as PCP - General (Family  Medicine)    Review of Systems  Bilateral shoulder pain  Occasional shortness of breath with stairs    Objective:   Physical Exam  General Appearance:  Alert, cooperative, no distress, appears stated age   Head:  Normocephalic, without obvious abnormality, atraumatic   Eyes:  PERRL, conjunctiva/corneas clear, EOM's intact both eyes   Ears:  Normal TM's and external ear canals, both ears   Nose: Nares normal, septum midline,mucosa normal, no drainage or sinus tenderness   Throat: Lips, mucosa, and tongue normal; teeth and gums normal   Neck: Supple, symmetrical, trachea midline, no adenopathy;  thyroid: not enlarged, symmetric, no tenderness/mass/nodules; no carotid bruit or JVD   Back:   Symmetric, no curvature, ROM normal, no CVA tenderness   Lungs:   Clear to auscultation bilaterally, respirations unlabored   Heart:  Regular rate and rhythm, S1 and S2 normal, no murmur, rub, or gallop   Abdomen:   Soft, non-tender, bowel sounds active all four quadrants,  no masses, no organomegaly   Pelvic: Deferred   Extremities: Extremities normal, atraumatic, no cyanosis or edema   Pulses: 2+ and symmetric   Skin: Skin color, texture, turgor normal, no rashes or lesions   Lymph nodes: Cervical, supraclavicular, and axillary nodes normal   Neurologic: Normal    and Breasts:  normal appearance, no masses or tenderness  EKG        Narrative  Performed by: [facility]  Normal sinus rhythm  Left bundle branch block  Abnormal ECG  No previous ECGs found in [facility]      Specimen Collected: 04/30/24 10:59 AM Last Resulted: 04/30/24 12:26 PM           /74 (BP Location: Left arm, Patient Position: Sitting, Cuff Size: large)   Pulse 74   Ht 4' 9.5\" (1.461 m)   Wt 182 lb (82.6 kg)   LMP  (LMP Unknown)   BMI 38.70 kg/m²  Estimated body mass index is 38.7 kg/m² as calculated from the following:    Height as of this encounter: 4' 9.5\" (1.461 m).    Weight as of this encounter: 182 lb (82.6 kg).    Medicare Hearing Assessment:   Hearing  Screening    Time taken: 4/30/2024 11:18 AM  Entry User: Yi Martinez CMA  Screening Method: Finger Rub  Finger Rub Result: Pass (Comment: patient wears hearing aids)         Visual Acuity:   Right Eye Visual Acuity: Corrected Right Eye Chart Acuity: 20/25   Left Eye Visual Acuity: Corrected Left Eye Chart Acuity: 20/25   Both Eyes Visual Acuity: Corrected Both Eyes Chart Acuity: 20/25   Able To Tolerate Visual Acuity: Yes        Assessment & Plan:   Kalani Reyes is a 89 year old female who presents for a Medicare Assessment.     1. Encounter for annual health examination (Primary)-patient is , 3 adult children.  This will be her first summer not working.  Encourage regular exercise, social contacts.  Bilateral shoulder pain with osteoarthritis.  Has done physical therapy.  She will return to Dr. Beltran to discuss possible injections.  2. Chronic kidney disease, stage 3a (HCC)-continue to control blood pressure, maintain hydration, avoid nephrotoxins.  3. Major depressive disorder, recurrent, moderate (HCC)-stable on Cymbalta  4. Primary hyperparathyroidism (HCC)-patient does experience some body aches, fatigue.  Last calcium 10.7 mg/dL.  PTH 93.  Overview:  1/2024-evaluation with Dr. Graham.  Plan to monitor.  5. Class 2 severe obesity due to excess calories with serious comorbidity and body mass index (BMI) of 38.0 to 38.9 in adult ([facility])-diet and exercise discussed  6. Hypertension, benign-blood pressure controlled valsartan/HCT  7. Mixed hyperlipidemia-stable on atorvastatin  8. Left bundle branch block (LBBB)-unchanged on EKG today.  See below  Overview:  7/2016 EKG [facility]  9. Prediabetes-encourage regular exercise, weight loss diet.  Overview:  7/2016  10. Osteopenia, unspecified location-recheck bone density next year  Overview:  11/2017, Ca and vit D, repeat in 2-3 yr. 1/2023- osteopenia, slight increase fracture risk.  Start alendronate.  11. Meniere's disease, unspecified  laterality-stable  12. Presbycusis of both ears-stable  Overview:  Hearing aids    13. Dyspnea on exertion-patient notes sometimes out of breath with climbing stairs.  When this occurs she feels rapid heartbeat.  No chest pain, orthopnea, pedal edema.  EKG with left bundle branch block, unchanged from 2016 at which time she had had echocardiogram and lexicon stress test at [facility].  Suspect likely deconditioning.  She will follow-up if worsening but at this time recommend starting regular low-grade exercise.  -     EKG In-Office [[id number]]  14.  RUFINO-using CPAP consistently more than 4 hours at night for 80% of nights.  Feels a little more rested in the morning.  Blood pressure improved.    The patient indicates understanding of these issues and agrees to the plan.  Continue with current treatment plan.  Reinforced healthy diet, lifestyle, and exercise.      Return in about 4 months (around 8/30/2024) for hypertension.     Shayla Pierre MD, 4/30/2024     Supplementary Documentation:   General Health:  In the past six months, have you lost more than 10 pounds without trying?: 2 - No  Has your appetite been poor?: No  Type of Diet: Balanced  How does the patient maintain a good energy level?: Other  How would you describe your daily physical activity?: Light  How would you describe your current health state?: Good  How do you maintain positive mental well-being?: Visiting Family  On a scale of 0 to 10, with 0 being no pain and 10 being severe pain, what is your pain level?: 0 - (None)  In the past six months, have you experienced urine leakage?: 0-No  At any time do you feel concerned for the safety/well-being of yourself and/or your children, in your home or elsewhere?: No  Have you had any immunizations at another office such as Influenza, Hepatitis B, Tetanus, or Pneumococcal?: No       Kalani Reyes's SCREENING SCHEDULE   Tests on this list are recommended by your physician but may not be covered, or covered at this  frequency, by your insurer.   Please check with your insurance carrier before scheduling to verify coverage.   PREVENTATIVE SERVICES FREQUENCY &  COVERAGE DETAILS LAST COMPLETION DATE   Diabetes Screening    Fasting Blood Sugar /  Glucose    One screening every 12 months if never tested or if previously tested but not diagnosed with pre-diabetes   One screening every 6 months if diagnosed with pre-diabetes Lab Results   Component Value Date     (H) 12/01/2023        Cardiovascular Disease Screening    Lipid Panel  Cholesterol  Lipoprotein (HDL)  Triglycerides Covered every 5 years for all Medicare beneficiaries without apparent signs or symptoms of cardiovascular disease Lab Results   Component Value Date    CHOLEST 231 (H) 12/01/2023    HDL 60 (H) 12/01/2023     (H) 12/01/2023    TRIG 125 12/01/2023         Electrocardiogram (EKG)   Covered if needed at Welcome to Medicare, and non-screening if indicated for medical reasons -      Ultrasound Screening for Abdominal Aortic Aneurysm (AAA) Covered once in a lifetime for one of the following risk factors    Men who are 65-75 years old and have ever smoked    Anyone with a family history -     Colorectal Cancer Screening  Covered for ages 50-85; only need ONE of the following:    Colonoscopy   Covered every 10 years    Covered every 2 years if patient is at high risk or previous colonoscopy was abnormal 10/23/2015    No recommendations at this time    Flexible Sigmoidoscopy   Covered every 4 years -    Fecal Occult Blood Test Covered annually -   Bone Density Screening    Bone density screening    Covered every 2 years after age 65 if diagnosed with risk of osteoporosis or estrogen deficiency.    Covered yearly for long-term glucocorticoid medication use (Steroids) Last Dexa Scan:    XR DEXA BONE DENSITOMETRY (CPT=77080) 01/06/2023      No recommendations at this time   Pap and Pelvic    Pap   Covered every 2 years for women at normal risk; Annually if at  high risk -  No recommendations at this time    Chlamydia Annually if high risk -  No recommendations at this time   Screening Mammogram    Mammogram     Recommend annually for all female patients aged 40 and older    One baseline mammogram covered for patients aged 35-39 -    No recommendations at this time    Immunizations    Influenza Covered once per flu season  Please get every year 11/15/2023  No recommendations at this time    Pneumococcal Each vaccine ([id number] & [id number]) covered once after 65 Prevnar 13: 09/29/2017    [id number]: 09/05/2014     No recommendations at this time    Hepatitis B One screening covered for patients with certain risk factors   -  No recommendations at this time    Tetanus Toxoid Not covered by Medicare Part B unless medically necessary (cut with metal); may be covered with your pharmacy prescription benefits -    Tetanus, Diptheria and Pertusis TD and TDaP Not covered by Medicare Part B -  No recommendations at this time    Zoster Not covered by Medicare Part B; may be covered with your pharmacy  prescription benefits 03/12/2008  No recommendations at this time     Annual Monitoring of Persistent Medications (ACE/ARB, digoxin diuretics, anticonvulsants)    Potassium Annually Lab Results   Component Value Date    K 4.2 12/01/2023         Creatinine   Annually Lab Results   Component Value Date    CREATSERUM 1.00 12/01/2023         BUN Annually Lab Results   Component Value Date    BUN 26 (H) 12/01/2023       Drug Serum Conc Annually No results found for: \"DIGOXIN\", \"DIG\", \"VALP\"

## 2024-04-30 NOTE — PROGRESS NOTES
Subjective:   Kalani Reyes is a 89 year old female who presents for a {Medicare Annual Wellness Description:3401} and {Medicare AWV HPI for better documentation of chronic or new problems to help justify LOS with AWV codes:60191}.   ***    History/Other:   Fall Risk Assessment: {[facility]  Fall Risk Assessment:8307}  She has been screened for Falls and is low risk.      Cognitive Assessment: {Tip  Cognitive Assessment:8307}  Abnormal  What day of the week is this?: Correct  What month is it?: Correct  What year is it?: Correct  Recall \"Ball\": Correct  Recall \"Flag\": Incorrect  Recall \"Tree\": Correct    Functional Ability/Status: {Tip  Functional Status:8307}  Kalani Reyes has some abnormal functions as listed below:  She has Driving difficulties based on screening of functional status. She has Hearing problems based on screening of functional status.She has Vision problems based on screening of functional status. She has Walking problems based on screening of functional status. She has problems with Memory based on screening of functional status.       Depression Screening (PHQ-2/PHQ-9): {Tip  Depression Screenin}PHQ-2 SCORE: 0  , done 2024        {Option to record time spent screening & counseling patient for depression (5+ minutes = ):22523}    Advanced Directives: {Tip  Advance Care Plannin}  She does NOT have a Living Will. [Do you have a living will?: No]  She does NOT have a Power of  for Health Care. [Do you have a healthcare power of ?: No]  {Advanced Directive Status:7286}    {Tip ResultsPMHPSHFHProbListImagingCardioLabAllergiesImm :8307}  Patient Active Problem List   Diagnosis   • Hypertension, benign   • Mixed hyperlipidemia   • Meniere's disease   • Osteopenia   • Prediabetes   • Left bundle branch block (LBBB)   • Presbycusis of both ears   • Chronic kidney disease, stage 3a (HCC)   • Major depressive disorder, recurrent, moderate (HCC)   • Primary  hyperparathyroidism (HCC)   • Class 2 severe obesity due to excess calories with serious comorbidity and body mass index (BMI) of 38.0 to 38.9 in adult (HCC)     Allergies:  She has No Known Allergies.    Current Medications:  Outpatient Medications Marked as Taking for the 4/30/24 encounter (Office Visit) with Shayla Pierre MD   Medication Sig   • DULoxetine 30 MG Oral Cap DR Particles Take 1 capsule (30 mg total) by mouth daily.   • valsartan-hydroCHLOROthiazide 80-12.5 MG Oral Tab Take 1 tablet by mouth daily.   • alendronate 70 MG Oral Tab Take 1 tablet (70 mg total) by mouth every 7 days.   • aspirin (ASPIRIN LOW DOSE) 81 MG Oral Tab EC Take 1 tablet (81 mg total) by mouth daily.   • Betahistine HCl (BETAHISTINE DIHYDROCHLORIDE) Does not apply Powder Take 16 mg by mouth in the morning, at noon, and at bedtime. Po TID per ENT 16 MG CAPSULE. Use [facility] pharmacy   • triamcinolone 0.1 % External Cream Apply topically 2 (two) times daily as needed.   • Ascorbic Acid (VITAMIN C) 100 MG Oral Tab Take 1 tablet (100 mg total) by mouth daily.   • Turmeric 400 MG Oral Cap Take 1 capsule by mouth 3 (three) times daily.   • Calcium 500 MG Oral Tab Take  by mouth.       Medical History:  She  has a past medical history of DJD (degenerative joint disease) of knee, High blood pressure, High cholesterol, History of colonic polyps (10/23/2015), Obesity, unspecified, Osteoarthritis, Osteopenia (2002, 2006, 2012), Osteoporosis (1999), Primary osteoarthritis involving multiple joints (8/28/2015), and Unspecified essential hypertension.  Surgical History:  She  has no past surgical history on file.   Family History:  Her family history includes Other in her father and mother.  Social History:  She  reports that she has never smoked. She has never used smokeless tobacco. She reports that she does not drink alcohol and does not use drugs.    Tobacco:  She has never smoked tobacco.    CAGE Alcohol Screen: {Tip  CAGE Alcohol  Screen:8307}  CAGE screening score of 0 on 4/30/2024, showing low risk of alcohol abuse.    {Tip   Care Team:8307}  Patient Care Team:  Shayla Pierre MD as PCP - General (Family Medicine)    Review of Systems  {Female Review of Systems (Optional):7352}    Objective:   Physical Exam  {Use this to document a Female Complete Physical Exam (pelvic deferred) - Defaults to Blank -DEL to delete as it is not needed for AWV but is needed for CPX or Medicare Supervisit:6118}    /74 (BP Location: Left arm, Patient Position: Sitting, Cuff Size: large)   Pulse 74   Ht 4' 9.5\" (1.461 m)   Wt 182 lb (82.6 kg)   LMP  (LMP Unknown)   BMI 38.70 kg/m²  Estimated body mass index is 38.7 kg/m² as calculated from the following:    Height as of this encounter: 4' 9.5\" (1.461 m).    Weight as of this encounter: 182 lb (82.6 kg).    Medicare Hearing Assessment: {Tip (Required for AWV/SWV) Hearing Assessment:8307}  Hearing Screening    Time taken: 4/30/2024 11:18 AM  Entry User: Yi Martinez CMA  Screening Method: Finger Rub  Finger Rub Result: Pass (Comment: patient wears hearing aids)         Visual Acuity:   Right Eye Visual Acuity: Corrected Right Eye Chart Acuity: 20/25   Left Eye Visual Acuity: Corrected Left Eye Chart Acuity: 20/25   Both Eyes Visual Acuity: Corrected Both Eyes Chart Acuity: 20/25   Able To Tolerate Visual Acuity: Yes        Assessment & Plan:   Kalani Reyes is a 89 year old female who presents for a Medicare Assessment.     1. Encounter for annual health examination (Primary)  2. Chronic kidney disease, stage 3a (HCC)  3. Major depressive disorder, recurrent, moderate (HCC)  4. Primary hyperparathyroidism (HCC)  Overview:  1/2024-evaluation with Dr. Graham.  Plan to monitor.  5. Class 2 severe obesity due to excess calories with serious comorbidity and body mass index (BMI) of 38.0 to 38.9 in adult (HCC)  6. Hypertension, benign  7. Mixed hyperlipidemia  8. Left bundle branch block  (LBBB)  Overview:  7/2016 EKG [facility]  9. Prediabetes  Overview:  7/2016  10. Osteopenia, unspecified location  Overview:  11/2017, Ca and vit D, repeat in 2-3 yr. 1/2023- osteopenia, slight increase fracture risk.  Start alendronate.  11. Meniere's disease, unspecified laterality  12. Presbycusis of both ears  Overview:  Hearing aids    13. Dyspnea on exertion  -     EKG In-Office [[id number]]    The patient indicates understanding of these issues and agrees to the plan.  {lifestyle and A/P options:5845::\"Reinforced healthy diet, lifestyle, and exercise.\"}    {Tip  Follow Up:8307}  Return in about 4 months (around 8/30/2024) for hypertension.     Shayla Pierre MD, 4/30/2024     Supplementary Documentation:   General Health:  In the past six months, have you lost more than 10 pounds without trying?: 2 - No  Has your appetite been poor?: No  Type of Diet: Balanced  How does the patient maintain a good energy level?: Other  How would you describe your daily physical activity?: Light  How would you describe your current health state?: Good  How do you maintain positive mental well-being?: Visiting Family  On a scale of 0 to 10, with 0 being no pain and 10 being severe pain, what is your pain level?: 0 - (None)  In the past six months, have you experienced urine leakage?: 0-No  At any time do you feel concerned for the safety/well-being of yourself and/or your children, in your home or elsewhere?: No  Have you had any immunizations at another office such as Influenza, Hepatitis B, Tetanus, or Pneumococcal?: No       Kalani Reyes's SCREENING SCHEDULE   Tests on this list are recommended by your physician but may not be covered, or covered at this frequency, by your insurer.   Please check with your insurance carrier before scheduling to verify coverage.   PREVENTATIVE SERVICES FREQUENCY &  COVERAGE DETAILS LAST COMPLETION DATE   Diabetes Screening    Fasting Blood Sugar /  Glucose    One screening every 12 months if  never tested or if previously tested but not diagnosed with pre-diabetes   One screening every 6 months if diagnosed with pre-diabetes Lab Results   Component Value Date     (H) 12/01/2023        Cardiovascular Disease Screening    Lipid Panel  Cholesterol  Lipoprotein (HDL)  Triglycerides Covered every 5 years for all Medicare beneficiaries without apparent signs or symptoms of cardiovascular disease Lab Results   Component Value Date    CHOLEST 231 (H) 12/01/2023    HDL 60 (H) 12/01/2023     (H) 12/01/2023    TRIG 125 12/01/2023         Electrocardiogram (EKG)   Covered if needed at Welcome to Medicare, and non-screening if indicated for medical reasons -      Ultrasound Screening for Abdominal Aortic Aneurysm (AAA) Covered once in a lifetime for one of the following risk factors   • Men who are 65-75 years old and have ever smoked   • Anyone with a family history -     Colorectal Cancer Screening  Covered for ages 50-85; only need ONE of the following:    Colonoscopy   Covered every 10 years    Covered every 2 years if patient is at high risk or previous colonoscopy was abnormal 10/23/2015    No recommendations at this time    Flexible Sigmoidoscopy   Covered every 4 years -    Fecal Occult Blood Test Covered annually -   Bone Density Screening    Bone density screening    Covered every 2 years after age 65 if diagnosed with risk of osteoporosis or estrogen deficiency.    Covered yearly for long-term glucocorticoid medication use (Steroids) Last Dexa Scan:    XR DEXA BONE DENSITOMETRY (CPT=77080) 01/06/2023      No recommendations at this time   Pap and Pelvic    Pap   Covered every 2 years for women at normal risk; Annually if at high risk -  No recommendations at this time    Chlamydia Annually if high risk -  No recommendations at this time   Screening Mammogram    Mammogram     Recommend annually for all female patients aged 40 and older    One baseline mammogram covered for patients aged 35-39  -    No recommendations at this time    Immunizations    Influenza Covered once per flu season  Please get every year 11/15/2023  No recommendations at this time    Pneumococcal Each vaccine ([id number] & [id number]) covered once after 65 Prevnar 13: 09/29/2017    Causandic79: 09/05/2014     No recommendations at this time    Hepatitis B One screening covered for patients with certain risk factors   -  No recommendations at this time    Tetanus Toxoid Not covered by Medicare Part B unless medically necessary (cut with metal); may be covered with your pharmacy prescription benefits -    Tetanus, Diptheria and Pertusis TD and TDaP Not covered by Medicare Part B -  No recommendations at this time    Zoster Not covered by Medicare Part B; may be covered with your pharmacy  prescription benefits 03/12/2008  No recommendations at this time     Annual Monitoring of Persistent Medications (ACE/ARB, digoxin diuretics, anticonvulsants)    Potassium Annually Lab Results   Component Value Date    K 4.2 12/01/2023         Creatinine   Annually Lab Results   Component Value Date    CREATSERUM 1.00 12/01/2023         BUN Annually Lab Results   Component Value Date    BUN 26 (H) 12/01/2023       Drug Serum Conc Annually No results found for: \"DIGOXIN\", \"DIG\", \"VALP\"

## 2024-04-30 NOTE — PATIENT INSTRUCTIONS
Kalani Reyes's SCREENING SCHEDULE   Tests on this list are recommended by your physician but may not be covered, or covered at this frequency, by your insurer.   Please check with your insurance carrier before scheduling to verify coverage.   PREVENTATIVE SERVICES FREQUENCY &  COVERAGE DETAILS LAST COMPLETION DATE   Diabetes Screening    Fasting Blood Sugar /  Glucose    One screening every 12 months if never tested or if previously tested but not diagnosed with pre-diabetes   One screening every 6 months if diagnosed with pre-diabetes Lab Results   Component Value Date     (H) 12/01/2023        Cardiovascular Disease Screening    Lipid Panel  Cholesterol  Lipoprotein (HDL)  Triglycerides Covered every 5 years for all Medicare beneficiaries without apparent signs or symptoms of cardiovascular disease Lab Results   Component Value Date    CHOLEST 231 (H) 12/01/2023    HDL 60 (H) 12/01/2023     (H) 12/01/2023    TRIG 125 12/01/2023         Electrocardiogram (EKG)   Covered if needed at Welcome to Medicare, and non-screening if indicated for medical reasons -      Ultrasound Screening for Abdominal Aortic Aneurysm (AAA) Covered once in a lifetime for one of the following risk factors   • Men who are 65-75 years old and have ever smoked   • Anyone with a family history -     Colorectal Cancer Screening  Covered for ages 50-85; only need ONE of the following:    Colonoscopy   Covered every 10 years    Covered every 2 years if patient is at high risk or previous colonoscopy was abnormal 10/23/2015    No recommendations at this time    Flexible Sigmoidoscopy   Covered every 4 years -    Fecal Occult Blood Test Covered annually -   Bone Density Screening    Bone density screening    Covered every 2 years after age 65 if diagnosed with risk of osteoporosis or estrogen deficiency.    Covered yearly for long-term glucocorticoid medication use (Steroids) Last Dexa Scan:    XR DEXA BONE DENSITOMETRY (CPT=77080)  01/06/2023      No recommendations at this time   Pap and Pelvic    Pap   Covered every 2 years for women at normal risk; Annually if at high risk -  No recommendations at this time    Chlamydia Annually if high risk -  No recommendations at this time   Screening Mammogram    Mammogram     Recommend annually for all female patients aged 40 and older    One baseline mammogram covered for patients aged 35-39 -    No recommendations at this time    Immunizations    Influenza Covered once per flu season  Please get every year 11/15/2023  No recommendations at this time    Pneumococcal Each vaccine (Ajmuxwy98 & Eallecarm33) covered once after 65 Prevnar 13: 09/29/2017    Hfbrxaagd58: 09/05/2014     No recommendations at this time    Hepatitis B One screening covered for patients with certain risk factors   -  No recommendations at this time    Tetanus Toxoid Not covered by Medicare Part B unless medically necessary (cut with metal); may be covered with your pharmacy prescription benefits -    Tetanus, Diptheria and Pertusis TD and TDaP Not covered by Medicare Part B -  No recommendations at this time    Zoster Not covered by Medicare Part B; may be covered with your pharmacy  prescription benefits 03/12/2008  No recommendations at this time     Annual Monitoring of Persistent Medications (ACE/ARB, digoxin diuretics, anticonvulsants)    Potassium Annually Lab Results   Component Value Date    K 4.2 12/01/2023         Creatinine   Annually Lab Results   Component Value Date    CREATSERUM 1.00 12/01/2023         BUN Annually Lab Results   Component Value Date    BUN 26 (H) 12/01/2023       Drug Serum Conc Annually No results found for: \"DIGOXIN\", \"DIG\", \"VALP\"         Kalani Reyes's SCREENING SCHEDULE   Tests on this list are recommended by your physician but may not be covered, or covered at this frequency, by your insurer.   Please check with your insurance carrier before scheduling to verify coverage.   PREVENTATIVE  SERVICES FREQUENCY &  COVERAGE DETAILS LAST COMPLETION DATE   Diabetes Screening    Fasting Blood Sugar /  Glucose    One screening every 12 months if never tested or if previously tested but not diagnosed with pre-diabetes   One screening every 6 months if diagnosed with pre-diabetes Lab Results   Component Value Date     (H) 12/01/2023        Cardiovascular Disease Screening    Lipid Panel  Cholesterol  Lipoprotein (HDL)  Triglycerides Covered every 5 years for all Medicare beneficiaries without apparent signs or symptoms of cardiovascular disease Lab Results   Component Value Date    CHOLEST 231 (H) 12/01/2023    HDL 60 (H) 12/01/2023     (H) 12/01/2023    TRIG 125 12/01/2023         Electrocardiogram (EKG)   Covered if needed at Welcome to Medicare, and non-screening if indicated for medical reasons -      Ultrasound Screening for Abdominal Aortic Aneurysm (AAA) Covered once in a lifetime for one of the following risk factors   • Men who are 65-75 years old and have ever smoked   • Anyone with a family history -     Colorectal Cancer Screening  Covered for ages 50-85; only need ONE of the following:    Colonoscopy   Covered every 10 years    Covered every 2 years if patient is at high risk or previous colonoscopy was abnormal 10/23/2015    No recommendations at this time    Flexible Sigmoidoscopy   Covered every 4 years -    Fecal Occult Blood Test Covered annually -   Bone Density Screening    Bone density screening    Covered every 2 years after age 65 if diagnosed with risk of osteoporosis or estrogen deficiency.    Covered yearly for long-term glucocorticoid medication use (Steroids) Last Dexa Scan:    XR DEXA BONE DENSITOMETRY (CPT=77080) 01/06/2023      No recommendations at this time   Pap and Pelvic    Pap   Covered every 2 years for women at normal risk; Annually if at high risk -  No recommendations at this time    Chlamydia Annually if high risk -  No recommendations at this time    Screening Mammogram    Mammogram     Recommend annually for all female patients aged 40 and older    One baseline mammogram covered for patients aged 35-39 -    No recommendations at this time    Immunizations    Influenza Covered once per flu season  Please get every year 11/15/2023  No recommendations at this time    Pneumococcal Each vaccine (Dzjavev42 & Iexkkvhoh24) covered once after 65 Prevnar 13: 09/29/2017    Luibtwvxw82: 09/05/2014     No recommendations at this time    Hepatitis B One screening covered for patients with certain risk factors   -  No recommendations at this time    Tetanus Toxoid Not covered by Medicare Part B unless medically necessary (cut with metal); may be covered with your pharmacy prescription benefits -    Tetanus, Diptheria and Pertusis TD and TDaP Not covered by Medicare Part B -  No recommendations at this time    Zoster Not covered by Medicare Part B; may be covered with your pharmacy  prescription benefits 03/12/2008  No recommendations at this time     Annual Monitoring of Persistent Medications (ACE/ARB, digoxin diuretics, anticonvulsants)    Potassium Annually Lab Results   Component Value Date    K 4.2 12/01/2023         Creatinine   Annually Lab Results   Component Value Date    CREATSERUM 1.00 12/01/2023         BUN Annually Lab Results   Component Value Date    BUN 26 (H) 12/01/2023       Drug Serum Conc Annually No results found for: \"DIGOXIN\", \"DIG\", \"VALP\"

## 2024-07-29 RX ORDER — ASPIRIN 81 MG/1
81 TABLET ORAL DAILY
Qty: 90 TABLET | Refills: 3 | Status: SHIPPED | OUTPATIENT
Start: 2024-07-29

## 2024-07-29 NOTE — TELEPHONE ENCOUNTER
Refill passed per [facility] protocol.    Requested Prescriptions   Pending Prescriptions Disp Refills    aspirin (ASPIRIN LOW DOSE) 81 MG Oral Tab EC 90 tablet 3     Sig: Take 1 tablet (81 mg total) by mouth daily.       Aspirin Protocol Passed - 7/29/2024  5:43 PM        Passed - In person appointment or virtual visit in the past 6 mos or appointment in next 3 mos     Recent Outpatient Visits              3 months ago Encounter for annual health examination    [facility], [facility] Shayla Pierre MD    Office Visit    5 months ago Essential hypertension    [facility] Shayla Pierre MD    Office Visit    6 months ago Vitamin D deficiency    [facility], Anjelica Westfall MD    Office Visit    7 months ago RUFINO (obstructive sleep apnea)    [facility]    Office Visit    8 months ago [facility]    Office Visit          Future Appointments         Provider Department Appt Notes    In 1 month Shayla Pierre MD [facility], [facility] 4 month f/u                          Recent Outpatient Visits              3 months ago Encounter for annual health examination    [facility], [facility] Shayla Pierre MD    Office Visit    5 months ago Essential hypertension    [facility] Shayla Pierre MD    Office Visit    6 months ago Vitamin D deficiency    [facility], Anjelica Westfall MD    Office Visit    7 months ago RUFINO (obstructive sleep apnea)    [facility]    Office Visit    [facility]    [facility]            Future Appointments         Provider Department Appt Notes    In 1 month Shayla Pierre MD [facility]  [facility] 4 month f/u

## 2024-07-29 NOTE — TELEPHONE ENCOUNTER
aspirin (ASPIRIN LOW DOSE) 81 MG Oral Tab EC, Take 1 tablet (81 mg total) by mouth daily., Disp: 90 tablet, Rfl: 3

## 2024-08-30 ENCOUNTER — LAB ENCOUNTER (OUTPATIENT)
Dept: LAB | Age: 89
End: 2024-08-30
Attending: FAMILY MEDICINE
Payer: MEDICARE

## 2024-08-30 ENCOUNTER — OFFICE VISIT (OUTPATIENT)
Dept: FAMILY MEDICINE CLINIC | Facility: CLINIC | Age: 89
End: 2024-08-30

## 2024-08-30 VITALS
HEIGHT: 57.48 IN | WEIGHT: 178 LBS | SYSTOLIC BLOOD PRESSURE: 125 MMHG | HEART RATE: 70 BPM | OXYGEN SATURATION: 96 % | DIASTOLIC BLOOD PRESSURE: 79 MMHG | TEMPERATURE: 98 F | BODY MASS INDEX: 37.88 KG/M2

## 2024-08-30 DIAGNOSIS — M19.011 PRIMARY OSTEOARTHRITIS OF RIGHT SHOULDER: ICD-10-CM

## 2024-08-30 DIAGNOSIS — E21.0 PRIMARY HYPERPARATHYROIDISM (HCC): ICD-10-CM

## 2024-08-30 DIAGNOSIS — E83.52 HYPERCALCEMIA: ICD-10-CM

## 2024-08-30 DIAGNOSIS — M54.2 CHRONIC NECK PAIN: ICD-10-CM

## 2024-08-30 DIAGNOSIS — N18.31 CHRONIC KIDNEY DISEASE, STAGE 3A (HCC): Primary | ICD-10-CM

## 2024-08-30 DIAGNOSIS — G89.29 CHRONIC NECK PAIN: ICD-10-CM

## 2024-08-30 DIAGNOSIS — N18.31 CHRONIC KIDNEY DISEASE, STAGE 3A (HCC): ICD-10-CM

## 2024-08-30 DIAGNOSIS — R73.03 PREDIABETES: ICD-10-CM

## 2024-08-30 LAB
ANION GAP SERPL CALC-SCNC: 8 MMOL/L (ref 0–18)
BUN BLD-MCNC: 31 MG/DL (ref 9–23)
BUN/CREAT SERPL: 32 (ref 10–20)
CALCIUM BLD-MCNC: 10.6 MG/DL (ref 8.7–10.4)
CHLORIDE SERPL-SCNC: 104 MMOL/L (ref 98–112)
CO2 SERPL-SCNC: 26 MMOL/L (ref 21–32)
CREAT BLD-MCNC: 0.97 MG/DL
EGFRCR SERPLBLD CKD-EPI 2021: 57 ML/MIN/1.73M2 (ref 60–?)
EST. AVERAGE GLUCOSE BLD GHB EST-MCNC: 126 MG/DL (ref 68–126)
FASTING STATUS PATIENT QL REPORTED: YES
GLUCOSE BLD-MCNC: 106 MG/DL (ref 70–99)
HBA1C MFR BLD: 6 % (ref ?–5.7)
OSMOLALITY SERPL CALC.SUM OF ELEC: 293 MOSM/KG (ref 275–295)
POTASSIUM SERPL-SCNC: 3.9 MMOL/L (ref 3.5–5.1)
PTH-INTACT SERPL-MCNC: 103.9 PG/ML (ref 18.5–88)
SODIUM SERPL-SCNC: 138 MMOL/L (ref 136–145)

## 2024-08-30 PROCEDURE — 83970 ASSAY OF PARATHORMONE: CPT

## 2024-08-30 PROCEDURE — 36415 COLL VENOUS BLD VENIPUNCTURE: CPT

## 2024-08-30 PROCEDURE — 83036 HEMOGLOBIN GLYCOSYLATED A1C: CPT

## 2024-08-30 PROCEDURE — 82330 ASSAY OF CALCIUM: CPT

## 2024-08-30 PROCEDURE — 80048 BASIC METABOLIC PNL TOTAL CA: CPT

## 2024-08-30 RX ORDER — ASPIRIN 81 MG/1
81 TABLET ORAL DAILY
Qty: 90 TABLET | Refills: 3 | Status: SHIPPED | OUTPATIENT
Start: 2024-08-30

## 2024-08-30 NOTE — PROGRESS NOTES
Subjective:   Patient ID: Kalani Reyes is a 89 year old female.    Patient presents to follow-up on hypercalcemia, CKD, hypertension and issues as below.        History/Other:   Review of Systems  Current Outpatient Medications   Medication Sig Dispense Refill    aspirin (ASPIRIN LOW DOSE) 81 MG Oral Tab EC Take 1 tablet (81 mg total) by mouth daily. 90 tablet 3    DULoxetine 30 MG Oral Cap DR Particles Take 1 capsule (30 mg total) by mouth daily. 90 capsule 3    valsartan-hydroCHLOROthiazide 80-12.5 MG Oral Tab Take 1 tablet by mouth daily. 90 tablet 3    alendronate 70 MG Oral Tab Take 1 tablet (70 mg total) by mouth every 7 days. 12 tablet 3    Betahistine HCl (BETAHISTINE DIHYDROCHLORIDE) Does not apply Powder Take 16 mg by mouth in the morning, at noon, and at bedtime. Po TID per ENT 16 MG CAPSULE. Use [facility] pharmacy 270 each 1    triamcinolone 0.1 % External Cream Apply topically 2 (two) times daily as needed. 60 g 3    Turmeric 400 MG Oral Cap Take 1 capsule by mouth 3 (three) times daily. 90 capsule 0    Calcium 500 MG Oral Tab Take  by mouth.      Ascorbic Acid (VITAMIN C) 100 MG Oral Tab Take 1 tablet (100 mg total) by mouth daily. (Patient not taking: Reported on 8/30/2024)       Allergies:No Known Allergies    Objective:   Physical Exam  Constitutional:       Appearance: Normal appearance.   Cardiovascular:      Rate and Rhythm: Normal rate and regular rhythm.      Pulses: Normal pulses.      Heart sounds: Murmur heard.   Pulmonary:      Effort: Pulmonary effort is normal.      Breath sounds: Normal breath sounds.   Musculoskeletal:      Right lower leg: No edema.      Left lower leg: No edema.   Neurological:      Mental Status: She is alert.         Assessment & Plan:   1. Chronic kidney disease, stage 3a (HCC)-continuing to control blood pressure, stay hydrated, avoid nephrotoxins.  She has gotten some relief from knee arthritis pain with gel injections from Dr. Beltran.   2. Hypercalcemia-reviewed  hypercalcemia, hyperparathyroidism.  She reports if calcium is increasing she may actually be interested in surgery as she is concerned hypercalcemia may be contributing to some of her pain.  Check labs as ordered.   3. Primary hyperparathyroidism (HCC)-as above   4. Prediabetes-encouraged regular exercise, continued efforts towards weight loss.  She is avoiding most breads, simple carbohydrates.   5. Primary osteoarthritis of right shoulder-has benefited from physical therapy in the past, would like to resume, order given.   6. Chronic neck pain-referred for physical therapy.       Orders Placed This Encounter   Procedures    Basic Metabolic Panel (8)    PTH, Intact [E]    Calcium Ionized-Out Patient    Hemoglobin A1C       Meds This Visit:  Requested Prescriptions     Signed Prescriptions Disp Refills    aspirin (ASPIRIN LOW DOSE) 81 MG Oral Tab EC 90 tablet 3     Sig: Take 1 tablet (81 mg total) by mouth daily.       Imaging & Referrals:  PHYSICAL THERAPY - INTERNAL

## 2024-09-03 LAB — LC CALCIUM, IONIZED: 5.2 MG/DL

## 2024-10-04 RX ORDER — SIMVASTATIN 20 MG
20 TABLET ORAL NIGHTLY
Qty: 90 TABLET | Refills: 3 | Status: SHIPPED | OUTPATIENT
Start: 2024-10-04

## 2024-10-04 NOTE — TELEPHONE ENCOUNTER
Refill passed per [facility] protocol.    Please review last office visit,08/30/2024        last refill 07/20/2023    Is refill appropriate?           The original prescription was discontinued on 1/30/2024 by Anjelica Graham MD. Renewing this prescription may not be appropriate.       Requested Prescriptions   Pending Prescriptions Disp Refills    SIMVASTATIN 20 MG Oral Tab [Pharmacy Med Name: SIMVASTATIN 20 MG TABLET] 90 tablet 3     Sig: TAKE 1 TABLET BY MOUTH EVERY DAY AT NIGHT       Cholesterol Medication Protocol Passed - 10/2/2024 12:29 AM        Passed - ALT < 80     Lab Results   Component Value Date    ALT 14 12/01/2023             Passed - ALT resulted within past year        Passed - Lipid panel within past 12 months     Lab Results   Component Value Date    CHOLEST 231 (H) 12/01/2023    TRIG 125 12/01/2023    HDL 60 (H) 12/01/2023     (H) 12/01/2023    VLDL 24 12/01/2023    NONHDLC 171 (H) 12/01/2023             Passed - In person appointment or virtual visit in the past 12 mos or appointment in next 3 mos     Recent Outpatient Visits              1 month ago Chronic kidney disease, stage 3a (HCC)    [facility]    5 months ago Encounter for annual health examination    [facility] Shayla Pierre MD    Office Visit    8 months ago Essential hypertension    [facility] Shayla Pierre MD    Office Visit    8 months ago Vitamin D deficiency    [facility] Anjelica Graham MD    Office Visit    10 months ago RUFINO (obstructive sleep apnea)    [facility]    Office Visit                         Recent Outpatient Visits              1 month ago Chronic kidney disease, stage 3a (HCC)    [facility] IgnacioShayla MD    Office Visit    5  months ago Encounter for annual health examination    [facility]    Office Visit    8 months ago Essential hypertension    [facility], [facility] Shayla Pierre MD    Office Visit    8 months ago Vitamin D deficiency    [facility], [facility] Anjelica Graham MD    Office Visit    10 months ago RUFINO (obstructive sleep apnea)    [facility]    Office Visit

## 2024-10-31 RX ORDER — VALSARTAN AND HYDROCHLOROTHIAZIDE 80; 12.5 MG/1; MG/1
1 TABLET, FILM COATED ORAL DAILY
Qty: 90 TABLET | Refills: 3 | Status: SHIPPED | OUTPATIENT
Start: 2024-10-31

## 2024-10-31 NOTE — TELEPHONE ENCOUNTER
REFILL PASSED PER [facility]     Please review pended refill request as unable to refill due to high/very high drug interaction warning copied here;       High  Lactation Warning: valsartan-hydroCHLOROthiazideNot recommended for Lactation  Details     Requested Prescriptions   Pending Prescriptions Disp Refills    VALSARTAN-HYDROCHLOROTHIAZIDE 80-12.5 MG Oral Tab [Pharmacy Med Name: VALSARTAN-HCTZ 80-12.5 MG TAB] 90 tablet 3     Sig: TAKE 1 TABLET BY MOUTH EVERY DAY       Hypertension Medications Protocol Passed - 10/31/2024  3:44 PM        Passed - CMP or BMP in past 12 months        Passed - Last BP reading less than 140/90     BP Readings from Last 1 Encounters:   08/30/24 125/79               Passed - In person appointment or virtual visit in the past 12 mos or appointment in next 3 mos     Recent Outpatient Visits              2 months ago Chronic kidney disease, stage 3a (HCC)    [facility] IgnacioShayla [facility]    Office Visit    6 months ago Encounter for annual health examination    [facility] Shayla Pierre MD    Office Visit    9 months ago Essential hypertension    [facility] Shayla Pierre MD    Office Visit    9 months ago Vitamin D deficiency    [facility] [place] Anjelica Graham MD    Office Visit    11 months ago RUFINO (obstructive sleep apnea)    [facility]    Office Visit                      Passed - EGFRCR or GFRNAA > 50     GFR Evaluation  EGFRCR: 57 , resulted on 8/30/2024                 Recent Outpatient Visits              2 months ago Chronic kidney disease, stage 3a (HCC)    [facility] Shayla Vasquez MD    Office Visit    6 months ago Encounter for annual health examination    [facility] Shayla Vasquez MD     Office Visit    9 months ago Essential hypertension    [facility]    Office Visit    9 months ago Vitamin D deficiency    [facility] [place] Anjelica Graham MD    Office Visit    11 months ago RUFINO (obstructive sleep apnea)    [facility]    Office Visit

## 2024-11-22 DIAGNOSIS — M85.80 OSTEOPENIA, UNSPECIFIED LOCATION: Primary | ICD-10-CM

## 2024-11-26 RX ORDER — ALENDRONATE SODIUM 70 MG/1
70 TABLET ORAL
Qty: 12 TABLET | Refills: 3 | Status: SHIPPED | OUTPATIENT
Start: 2024-11-26

## 2024-11-26 NOTE — TELEPHONE ENCOUNTER
Please review; protocol failed/ has no protocol      Digna Lackey, RN1 hour ago (9:54 AM)     [place]  Patient's son Venu desai,verified patient name and date of birth.   Request refill for alendronate. Next does is due Friday.  Medication  pended to run through protocol.          Requested Prescriptions   Pending Prescriptions Disp Refills    ALENDRONATE 70 MG Oral Tab [Pharmacy Med Name: ALENDRONATE SODIUM 70 MG TAB] 12 tablet 3     Sig: TAKE 1 TABLET (70 MG TOTAL) BY MOUTH EVERY 7 DAYS       Osteoporosis Medication Protocol Failed - 11/26/2024 11:05 AM        Failed - Calcium level between 8.3 and 10.3     Lab Results   Component Value Date    CA 10.6 (H) 08/30/2024               Passed - In person appointment or virtual visit in the past 6 mos or appointment in next 3 mos     Recent Outpatient Visits              2 months ago Chronic kidney disease, stage 3a (HCC)    [facility] Shayla Pierre MD    Office Visit    7 months ago Encounter for annual health examination    [facility] Shayla Pierre MD    Office Visit    9 months ago Essential hypertension    [facility] Shayla Pierre MD    Office Visit    10 months ago Vitamin D deficiency    [facility], [facility], [place] Anjelica Graham MD    Office Visit    11 months ago RUFINO (obstructive sleep apnea)    [facility]    Office Visit          Future Appointments         Provider Department Appt Notes    In 1 week Shayla Pierre MD [facility]                     Passed - DEXA scan within past 2 years        Passed - CMP within the past 12 months        Passed - GFR level greater than 35     GFR Evaluation  EGFRCR: 57 , resulted on 8/30/2024             Recent Outpatient Visits              2 months ago Chronic kidney disease, stage 3a (HCC)    [facility] Shayla Pierre MD    Office Visit    7 months ago Encounter for annual health examination    [facility], [facility] Shayla Pierre MD    Office Visit    9 months ago Essential hypertension    [facility] Shayla Pierre MD    Office Visit    10 months ago Vitamin D deficiency    [facility], [facility] Anjelica Graham MD    Office Visit    11 months ago RUFINO (obstructive sleep apnea)    [facility]    Office Visit          Future Appointments         Provider Department Appt Notes    In 1 week Shayla Pierre MD [facility]

## 2024-11-26 NOTE — TELEPHONE ENCOUNTER
Patient's son Venu desai,verified patient name and date of birth.   Request refill for alendronate. Next does is due Friday.  Medication  pended to run through protocol.

## 2024-12-04 ENCOUNTER — OFFICE VISIT (OUTPATIENT)
Dept: FAMILY MEDICINE CLINIC | Facility: CLINIC | Age: 89
End: 2024-12-04

## 2024-12-04 VITALS
OXYGEN SATURATION: 98 % | DIASTOLIC BLOOD PRESSURE: 73 MMHG | HEART RATE: 74 BPM | BODY MASS INDEX: 38 KG/M2 | WEIGHT: 179 LBS | SYSTOLIC BLOOD PRESSURE: 132 MMHG

## 2024-12-04 DIAGNOSIS — I10 HYPERTENSION, BENIGN: Primary | ICD-10-CM

## 2024-12-04 DIAGNOSIS — M15.0 PRIMARY OSTEOARTHRITIS INVOLVING MULTIPLE JOINTS: ICD-10-CM

## 2024-12-04 PROCEDURE — G0008 ADMIN INFLUENZA VIRUS VAC: HCPCS | Performed by: FAMILY MEDICINE

## 2024-12-04 PROCEDURE — 99213 OFFICE O/P EST LOW 20 MIN: CPT | Performed by: FAMILY MEDICINE

## 2024-12-04 PROCEDURE — 90662 IIV NO PRSV INCREASED AG IM: CPT | Performed by: FAMILY MEDICINE

## 2024-12-04 NOTE — PROGRESS NOTES
Subjective:   Patient ID: Kalani Reyes is a 89 year old female.    Hypertension  This is a chronic problem. The current episode started more than 1 year ago. The problem is unchanged. The problem is controlled. Pertinent negatives include no chest pain or headaches. There are no associated agents to hypertension. Hypertensive end-organ damage includes kidney disease.       History/Other:   Review of Systems   Cardiovascular:  Negative for chest pain.   Neurological:  Negative for headaches.   Current Outpatient Medications   Medication Sig Dispense Refill   • alendronate 70 MG Oral Tab Take 1 tablet (70 mg total) by mouth every 7 days. 12 tablet 3   • valsartan-hydroCHLOROthiazide 80-12.5 MG Oral Tab Take 1 tablet by mouth daily. 90 tablet 3   • simvastatin 20 MG Oral Tab Take 1 tablet (20 mg total) by mouth nightly. 90 tablet 3   • aspirin (ASPIRIN LOW DOSE) 81 MG Oral Tab EC Take 1 tablet (81 mg total) by mouth daily. 90 tablet 3   • DULoxetine 30 MG Oral Cap DR Particles Take 1 capsule (30 mg total) by mouth daily. 90 capsule 3   • Betahistine HCl (BETAHISTINE DIHYDROCHLORIDE) Does not apply Powder Take 16 mg by mouth in the morning, at noon, and at bedtime. Po TID per ENT 16 MG CAPSULE. Use [facility] 270 each 1   • triamcinolone 0.1 % External Cream Apply topically 2 (two) times daily as needed. 60 g 3   • Turmeric 400 MG Oral Cap Take 1 capsule by mouth 3 (three) times daily. 90 capsule 0   • Calcium 500 MG Oral Tab Take  by mouth.     • Ascorbic Acid (VITAMIN C) 100 MG Oral Tab Take 1 tablet (100 mg total) by mouth daily. (Patient not taking: Reported on 12/4/2024)       Allergies:Allergies[1]    Objective:   Physical Exam  Constitutional:       Appearance: Normal appearance.   Cardiovascular:      Rate and Rhythm: Normal rate and regular rhythm.      Pulses: Normal pulses.      Heart sounds: Normal heart sounds.   Pulmonary:      Effort: Pulmonary effort is normal.      Breath sounds: Normal breath  sounds.   Musculoskeletal:      Right lower leg: No edema.      Left lower leg: No edema.   Neurological:      Mental Status: She is alert.       Assessment & Plan:   1. Hypertension, benign-blood pressure doing well on current medication.  Overall she is feeling well.  Reviewed stable labs from last visit.  Plan to recheck labs with routine physical in 4.5 months.   2. Primary osteoarthritis involving multiple joints-completed physical therapy for left shoulder which was helpful still has somewhat decreased range of motion.  Receiving injections left knee, due for next injection 1/2024.       Orders Placed This Encounter   Procedures   • High Dose Fluzone trivalent influenza, 65yrs+ PFS (74272)       Meds This Visit:  Requested Prescriptions      No prescriptions requested or ordered in this encounter       Imaging & Referrals:  INFLUENZA VAC HIGH DOSE PRSV FREE         [1] No Known Allergies

## 2024-12-23 ENCOUNTER — TELEPHONE (OUTPATIENT)
Dept: FAMILY MEDICINE CLINIC | Facility: CLINIC | Age: 89
End: 2024-12-23

## 2024-12-23 NOTE — TELEPHONE ENCOUNTER
Condition update:  Patient's daughter Shayla called, verified patient's name/.  Patient had facial swelling, was taken to [facility] yesterday and admitted to ICU.  They think it might be related to blood pressure medication (valsartan hydrochlorothiazide).  Shayla is waiting to talk to the doctor.  If they discharge her home, asking Dr Pierre what blood pressure medication should she take?  Care Everywhere updated.

## 2024-12-23 NOTE — TELEPHONE ENCOUNTER
Shayla/daughter [not on signed verbal release] called and states the ICU provider has everything that they need, so Dr. Pierre does not need to call back. Patient will call for follow-up visit once she is discharged. I made her aware I will convey the above to Dr. Pierre. She verbalized understanding. No further questions or concerns at this time.    NURIA Pierre

## 2024-12-24 NOTE — TELEPHONE ENCOUNTER
Betahistine HCl (BETAHISTINE DIHYDROCHLORIDE) Does not apply Powder, Take 16 mg by mouth in the morning, at noon, and at bedtime. Po TID per ENT 16 MG CAPSULE. Use [facility] pharmacy, Disp: 270 each, Rfl: 1

## 2024-12-24 NOTE — TELEPHONE ENCOUNTER
Son Venu  calling,verified Patientname and date of birth. No release of information on file.  Venu calling to say that patient is hospitalized for possible allergic reaction to BP medication.  Advised caller as per 12/2233/24 notes below that his mother's hospital provider will prescribe medication as needed and she can schedule a follow up visit with Dr Pierre after discharge.

## 2024-12-24 NOTE — TELEPHONE ENCOUNTER
Patient's son calling to ask medication be sent to [facility] pharmacy instead.

## 2024-12-27 ENCOUNTER — TELEPHONE (OUTPATIENT)
Dept: FAMILY MEDICINE CLINIC | Facility: CLINIC | Age: 89
End: 2024-12-27

## 2024-12-27 NOTE — TELEPHONE ENCOUNTER
Called patient, confirmed name and .    Patient is stable and has started new BP med.  Needs follow up to make sure new medication is working for her blood pressure.  Patient scheduled.    Future Appointments   Date Time Provider Department Center   1/3/2025 10:45 AM [facility]   2025 11:00 AM [facility]

## 2024-12-27 NOTE — TELEPHONE ENCOUNTER
Patient was in the hospital for high blood pressure and needs a hospital follow up appointment. The doctor's first available appointment is not until 1/28/25.  Patient declined to see another provider; please call patient.    Please reply to pool: EM [facility] IM FM ALG [facility]HE [[id number]]

## 2024-12-27 NOTE — TELEPHONE ENCOUNTER
Routed to triage for status check. Dr Pierre has an opening next Friday. Please advise if we can schedule patient then or she needs to be seen sooner.

## 2024-12-30 RX ORDER — BETAHISTINE HCL 100 %
16 POWDER (GRAM) MISCELLANEOUS 3 TIMES DAILY
Qty: 270 EACH | Refills: 1 | Status: SHIPPED | OUTPATIENT
Start: 2024-12-30

## 2024-12-30 NOTE — TELEPHONE ENCOUNTER
Please review. Protocol Failed; No Protocol    Requested Prescriptions   Pending Prescriptions Disp Refills    Betahistine HCl (BETAHISTINE DIHYDROCHLORIDE) Does not apply Powder 270 each 1     Sig: Take 16 mg by mouth in the morning, at noon, and at bedtime. Po TID per ENT 16 MG CAPSULE. Use [facility]       There is no refill protocol information for this order            Future Appointments         Provider Department Appt Notes    In 4 days Shayla Pierre MD [facility] follow up    In 4 months Shayla Pierre MD [facility] annual Medicare wellness exam          Recent Outpatient Visits              3 weeks ago Hypertension, benign    [facility] Shayla Pierre MD    Office Visit    4 months ago Chronic kidney disease, stage 3a (HCC)    [facility] Shayla Pierre MD    Office Visit    8 months ago Encounter for annual health examination    [facility] Shayla Pierre MD    Office Visit    11 months ago Essential hypertension    [facility] Shayla Pierre MD    Office Visit    11 months ago Vitamin D deficiency    [facility], [facility] [place] Anjelica Graham MD    Office Visit

## 2025-01-03 ENCOUNTER — OFFICE VISIT (OUTPATIENT)
Dept: FAMILY MEDICINE CLINIC | Facility: CLINIC | Age: 89
End: 2025-01-03

## 2025-01-03 VITALS
SYSTOLIC BLOOD PRESSURE: 125 MMHG | DIASTOLIC BLOOD PRESSURE: 76 MMHG | WEIGHT: 180 LBS | OXYGEN SATURATION: 96 % | HEART RATE: 71 BPM | BODY MASS INDEX: 38 KG/M2

## 2025-01-03 DIAGNOSIS — T78.2XXD ANAPHYLAXIS, SUBSEQUENT ENCOUNTER: Primary | ICD-10-CM

## 2025-01-03 DIAGNOSIS — I10 ESSENTIAL HYPERTENSION: ICD-10-CM

## 2025-01-03 PROBLEM — Z87.892 HISTORY OF ANAPHYLAXIS: Status: ACTIVE | Noted: 2025-01-03

## 2025-01-03 PROCEDURE — 99214 OFFICE O/P EST MOD 30 MIN: CPT | Performed by: FAMILY MEDICINE

## 2025-01-03 PROCEDURE — G2211 COMPLEX E/M VISIT ADD ON: HCPCS | Performed by: FAMILY MEDICINE

## 2025-01-03 RX ORDER — SIMVASTATIN 20 MG
20 TABLET ORAL NIGHTLY
COMMUNITY
Start: 2025-01-03

## 2025-01-03 RX ORDER — FAMOTIDINE 20 MG/1
20 TABLET, FILM COATED ORAL 2 TIMES DAILY
COMMUNITY
Start: 2024-12-24 | End: 2025-01-03

## 2025-01-03 RX ORDER — AMLODIPINE BESYLATE 5 MG/1
5 TABLET ORAL DAILY
Qty: 90 TABLET | Refills: 3 | Status: SHIPPED | OUTPATIENT
Start: 2025-01-03

## 2025-01-03 RX ORDER — EPINEPHRINE 0.3 MG/.3ML
0.3 INJECTION SUBCUTANEOUS ONCE
Qty: 1 EACH | Refills: 0 | Status: SHIPPED | OUTPATIENT
Start: 2025-01-03 | End: 2025-01-03

## 2025-01-03 RX ORDER — PREDNISONE 20 MG/1
TABLET ORAL
COMMUNITY
Start: 2024-12-24 | End: 2025-01-03

## 2025-01-03 RX ORDER — AMLODIPINE BESYLATE 5 MG/1
5 TABLET ORAL DAILY
COMMUNITY
Start: 2024-12-23 | End: 2025-01-03

## 2025-01-03 NOTE — PROGRESS NOTES
Subjective:   Patient ID: Kalani Reyes is a 89 year old female.    Patient presents for follow-up hospitalization for anaphylaxis 12/22 - 12/24/2024.  Occurred after taking morning medications as below.  No history of anaphylaxis in the past.  Symptoms have completely resolved.        History/Other:   Review of Systems  Current Outpatient Medications   Medication Sig Dispense Refill    EPINEPHrine (EPIPEN 2-SHARMAINE) 0.3 MG/0.3ML Injection Solution Auto-injector Inject 0.3 mL (1 each total) as directed one time for 1 dose. 1 each 0    amLODIPine 5 MG Oral Tab Take 1 tablet (5 mg total) by mouth daily. 90 tablet 3    simvastatin 20 MG Oral Tab Take 1 tablet (20 mg total) by mouth nightly.      Betahistine HCl (BETAHISTINE DIHYDROCHLORIDE) Does not apply Powder Take 16 mg by mouth in the morning, at noon, and at bedtime. Po TID per ENT 16 MG CAPSULE. Use [facility] pharmacy 270 each 1    alendronate 70 MG Oral Tab Take 1 tablet (70 mg total) by mouth every 7 days. 12 tablet 3    DULoxetine 30 MG Oral Cap DR Particles Take 1 capsule (30 mg total) by mouth daily. 90 capsule 3    triamcinolone 0.1 % External Cream Apply topically 2 (two) times daily as needed. 60 g 3    Turmeric 400 MG Oral Cap Take 1 capsule by mouth 3 (three) times daily. 90 capsule 0    Calcium 500 MG Oral Tab Take  by mouth.       Allergies:Allergies[1]    Objective:   Physical Exam  Constitutional:       Appearance: Normal appearance.   Cardiovascular:      Rate and Rhythm: Normal rate and regular rhythm.      Pulses: Normal pulses.      Heart sounds: Murmur heard.      Comments: 1/6 systolic ejection murmur  Pulmonary:      Effort: Pulmonary effort is normal.      Breath sounds: Normal breath sounds.   Musculoskeletal:      Right lower leg: No edema.      Left lower leg: No edema.   Neurological:      Mental Status: She is alert.         Assessment & Plan:   1. Anaphylaxis, subsequent encounter-on 10/22/2025 patient had sudden onset of swelling hands and  feet, itching and tongue swelling.  Paramedics were called, she was hypotensive,  received epinephrine x 2 transported to ER, received IV steroids, Benadryl, famotidine.  Onset was shortly after taking valsartan and naproxen.  No history of anaphylaxis  She was discharged from the hospital on 12/24/2025 with amlodipine for hypertension and instructions to discontinue aspirin, valsartan, simvastatin.  She has had no recurrence of symptoms.  Recommend EpiPen as directed.  Although there is cardiovascular risk with such severe anaphylaxis and previous tolerance of epinephrine potential benefits would outweigh risk in the setting of anaphylaxis.  Reviewed instructions and importance of calling 911.  Recommend allergist evaluation.  She reports she is expecting referral from [facility], but she will see Dr. Hernandez if this is not received within the next week.  Okay to restart simvastatin as this is unlikely to have been positive anaphylaxis.     2. Essential hypertension-blood pressure well-controlled on amlodipine.  Follow-up in 2 months.       No orders of the defined types were placed in this encounter.      Meds This Visit:  Requested Prescriptions     Signed Prescriptions Disp Refills    EPINEPHrine (EPIPEN 2-SHARMAINE) 0.3 MG/0.3ML Injection Solution Auto-injector 1 each 0     Sig: Inject 0.3 mL (1 each total) as directed one time for 1 dose.    amLODIPine 5 MG Oral Tab 90 tablet 3     Sig: Take 1 tablet (5 mg total) by mouth daily.       Imaging & Referrals:  ALLERGY - INTERNAL         [1]   Allergies  Allergen Reactions    Naproxen ANAPHYLAXIS    Valsartan-Hydrochlorothiazide ANAPHYLAXIS and ANGIOEDEMA

## 2025-01-06 ENCOUNTER — TELEPHONE (OUTPATIENT)
Dept: FAMILY MEDICINE CLINIC | Facility: CLINIC | Age: 89
End: 2025-01-06

## 2025-01-06 NOTE — TELEPHONE ENCOUNTER
Patient calling,verified name and date of birth.  She is calling to check if amlodipine prescription was sent to pharmacy.    Advised patient that Dr Pierre sent amlodipine refills to [facility] an 1/3/25  Patient is feeling well today and will schedule an appointment with Dr Hernandez.   She has no further questions.

## 2025-01-16 ENCOUNTER — TELEPHONE (OUTPATIENT)
Dept: ALLERGY | Facility: CLINIC | Age: 89
End: 2025-01-16

## 2025-01-16 NOTE — TELEPHONE ENCOUNTER
Spoke with son of patient. Verified patient's name and . Informed son of patient's upcoming appointment with Dr. Wilcox on 25 at 2 pm located at [address] [place] [address] and to please arrive 15 minutes prior to appointment and to please have patient refrain from allergy medication 5 days prior to appointment. Son verbalizes understanding.

## 2025-01-28 ENCOUNTER — LAB ENCOUNTER (OUTPATIENT)
Dept: LAB | Age: 89
End: 2025-01-28
Attending: ALLERGY & IMMUNOLOGY
Payer: MEDICARE

## 2025-01-28 ENCOUNTER — OFFICE VISIT (OUTPATIENT)
Dept: ALLERGY | Facility: CLINIC | Age: 89
End: 2025-01-28

## 2025-01-28 VITALS
SYSTOLIC BLOOD PRESSURE: 101 MMHG | DIASTOLIC BLOOD PRESSURE: 62 MMHG | OXYGEN SATURATION: 97 % | RESPIRATION RATE: 18 BRPM | HEART RATE: 83 BPM

## 2025-01-28 DIAGNOSIS — Z88.6 ALLERGY TO NONSTEROIDAL ANTI-INFLAMMATORY DRUG (NSAID): ICD-10-CM

## 2025-01-28 DIAGNOSIS — T78.3XXA ANGIOEDEMA, INITIAL ENCOUNTER: ICD-10-CM

## 2025-01-28 DIAGNOSIS — T78.2XXA ANAPHYLAXIS, INITIAL ENCOUNTER: ICD-10-CM

## 2025-01-28 DIAGNOSIS — Z88.8: ICD-10-CM

## 2025-01-28 DIAGNOSIS — T78.2XXA ANAPHYLAXIS, INITIAL ENCOUNTER: Primary | ICD-10-CM

## 2025-01-28 LAB — C4 SERPL-MCNC: 42.3 MG/DL (ref 12–36)

## 2025-01-28 PROCEDURE — 86160 COMPLEMENT ANTIGEN: CPT

## 2025-01-28 PROCEDURE — 36415 COLL VENOUS BLD VENIPUNCTURE: CPT

## 2025-01-28 PROCEDURE — 86161 COMPLEMENT/FUNCTION ACTIVITY: CPT

## 2025-01-28 PROCEDURE — 99205 OFFICE O/P NEW HI 60 MIN: CPT | Performed by: ALLERGY & IMMUNOLOGY

## 2025-01-28 PROCEDURE — 83520 IMMUNOASSAY QUANT NOS NONAB: CPT

## 2025-01-28 NOTE — H&P
Kalani Reyes is a 89 year old female.    HPI:     Chief Complaint   Patient presents with    New Patient     Patient had anaphylactic reaction in December after taking her morning medication. Patient was treated in ER.     Patient is an 89-year-old female referred by primary care physician, Dr. Shayla Pierre, for new consult for evaluation of recent anaphylaxis/angioedema.    Patient reports taking her usual medications in the morning which included valsartan.  Patient later took Aleve/naproxen.  Less than 2 hours later she developed diffuse angioedema of her hands, feet, then face and tongue with dysphagia.    Paramedics were called.  Patient was given epinephrine x 2, transported to the emergency room, given IV H1 and H2 antihistamines plus steroids.    She denies any other flushing, pruritus, rash, or urticaria.        HISTORY:  Past Medical History:    DJD (degenerative joint disease) of knee    DJD knees    High blood pressure    High cholesterol    History of colonic polyps    10/2015, small polypectomy.  Consider repeat 2020, though difficult exam    Obesity, unspecified    Osteoarthritis    Osteopenia    BMD 2006 T -1.7, 2012 -1.6    Osteoporosis    osteoporosis lumbar spine    Primary osteoarthritis involving multiple joints    Unspecified essential hypertension      History reviewed. No pertinent surgical history.   Family History   Problem Relation Age of Onset    Other (Other) Father     Other (Other) Mother       Social History:   Social History     Socioeconomic History    Marital status: Single   Tobacco Use    Smoking status: Never     Passive exposure: Past    Smokeless tobacco: Never   Vaping Use    Vaping status: Never Used   Substance and Sexual Activity    Alcohol use: No     Alcohol/week: 0.0 standard drinks of alcohol     Comment: occasionally    Drug use: No     Social Drivers of Health      [facility]    Housing Stability         Medications (Active prior to today's visit):  Current Outpatient Medications   Medication Sig Dispense Refill    amLODIPine 5 MG Oral Tab Take 1 tablet (5 mg total) by mouth daily. 90 tablet 3    simvastatin 20 MG Oral Tab Take 1 tablet (20 mg total) by mouth nightly.      Betahistine HCl (BETAHISTINE DIHYDROCHLORIDE) Does not apply Powder Take 16 mg by mouth in the morning, at noon, and at bedtime. Po TID per ENT 16 MG CAPSULE. Use [facility] each 1    DULoxetine 30 MG Oral Cap DR Particles Take 1 capsule (30 mg total) by mouth daily. 90 capsule 3    alendronate 70 MG Oral Tab Take 1 tablet (70 mg total) by mouth every 7 days. (Patient not taking: Reported on 1/28/2025) 12 tablet 3    triamcinolone 0.1 % External Cream Apply topically 2 (two) times daily as needed. (Patient not taking: Reported on 1/28/2025) 60 g 3    Turmeric 400 MG Oral Cap Take 1 capsule by mouth 3 (three) times daily. (Patient not taking: Reported on 1/28/2025) 90 capsule 0    Calcium 500 MG Oral Tab Take  by mouth. (Patient not taking: Reported on 1/28/2025)         Allergies:  Allergies[1]      ROS:   Review of Systems   Constitutional:  Negative for activity change, appetite change, chills, diaphoresis, fatigue, fever and unexpected weight change.   HENT:  Negative for congestion, ear discharge, ear pain, facial swelling, hearing loss, mouth sores, postnasal drip, rhinorrhea, sinus pressure, sinus pain, sneezing, sore throat, tinnitus, trouble swallowing and voice change.    Eyes:  Negative for pain, discharge, redness and itching.   Respiratory:  Negative for apnea, cough, choking, chest tightness, shortness of breath, wheezing and stridor.    Cardiovascular:  Negative for chest pain and palpitations.   Gastrointestinal:  Negative for abdominal distention, abdominal pain, constipation, diarrhea, nausea and vomiting.   Endocrine: Negative for cold intolerance and heat intolerance.   Musculoskeletal:  Negative for arthralgias,  joint swelling and myalgias.   Skin:  Negative for pallor and rash.   Allergic/Immunologic: Negative for environmental allergies, food allergies and immunocompromised state.   Neurological:  Negative for headaches.   Psychiatric/Behavioral:  Negative for behavioral problems and sleep disturbance.           PHYSICAL EXAM:   Physical Exam  Constitutional:       Appearance: She is normal weight.   HENT:      Head: No right periorbital erythema or left periorbital erythema.      Right Ear: Tympanic membrane normal. There is no impacted cerumen.      Left Ear: Tympanic membrane normal. There is no impacted cerumen.      Nose: No congestion or rhinorrhea.      Mouth/Throat:      Pharynx: No oropharyngeal exudate or posterior oropharyngeal erythema.   Eyes:      General: Lids are normal. No allergic shiner.        Right eye: No discharge.         Left eye: No discharge.      Conjunctiva/sclera: Conjunctivae normal.      Right eye: Right conjunctiva is not injected. No exudate.     Left eye: Left conjunctiva is not injected. No exudate.  Cardiovascular:      Rate and Rhythm: Normal rate and regular rhythm.      Heart sounds: Normal heart sounds.   Pulmonary:      Effort: No tachypnea, prolonged expiration or respiratory distress.      Breath sounds: No stridor or decreased air movement. No decreased breath sounds, wheezing, rhonchi or rales.   Skin:     Coloration: Skin is not cyanotic or pale.      Findings: No acne, ecchymosis, erythema, petechiae or rash. Rash is not macular, nodular, papular, purpuric, pustular, scaling, urticarial or vesicular.           ASSESSMENT   Assessment   Encounter Diagnoses   Name Primary?    Anaphylaxis, initial encounter Yes    Angioedema, initial encounter     Allergy to nonsteroidal anti-inflammatory drug (NSAID)     Allergy to antihypertensive drug        PLAN     Discussed with patient that episodes of anaphylaxis with angioedema only are generally caused by ACE inhibitor's/angiotensin  receptor blockers; high dose aspirin; all traditional nonsteroidal anti-inflammatory drugs; rarely-female hormonal products.  This patient was taking 2 of the 3 most likely culprits, including angiotensin receptor blocker and frequent high-dose nonsteroidal anti-inflammatory drugs.    Although unlikely in this case,we will check for the possibility of acquired C1 esterase inhibitor deficiency, which can occur in occult lymphoproliferative diseases such as lymphoma, leukemia, multiple myeloma, monoclonal myeloma-a remote possible  contributor in this age group.       Orders This Visit:  No orders of the defined types were placed in this encounter.      Meds This Visit:  Requested Prescriptions      No prescriptions requested or ordered in this encounter       Imaging & Referrals:  None     1/28/2025  Soy Wilcox MD      If medication samples were provided today, they were provided solely for patient education and training related to self administration of these medications.  Teaching, instruction and sample was provided to the patient by myself.  Teaching included  a review of potential adverse side effects as well as potential efficacy.  Patient's questions were answered in regards to medication administration and dosing and potential side effects. Teaching was provided via the teach back method         [1]   Allergies  Allergen Reactions    Naproxen ANAPHYLAXIS    Valsartan-Hydrochlorothiazide ANAPHYLAXIS and ANGIOEDEMA

## 2025-01-28 NOTE — PATIENT INSTRUCTIONS
Patient will avoid ACE inhibitors and angiotensin receptor blockers - ARBs.    Patient will avoid high-dose aspirin, plus all traditional nonsteroidal anti-inflammatory drugs, including ibuprofen, Advil, Motrin, Naprosyn, Aleve, Clinoril, Feldene, Voltaren etc.    Patient should tolerate Tylenol/acetaminophen.  If necessary patient should tolerate Chambers 2 inhibitor nonsteroidal anti-inflammatory drugs, such as Mobic or Celebrex.  She would need prescription from her primary care physician.    Meanwhile, we will send patient for blood test to evaluate patient for unlikely acquired C1 esterase inhibitor deficiency.  Blood test would include C2, C4, C1 esterase inhibitor quantitative and functional, and serum tryptase levels    Patient will follow-up in our [place] allergy clinic in [facility] in 3 to 4 weeks; or she can call our office for results of lab test above.

## 2025-01-31 LAB — C1 EST INHIB FUNC: 105 %MEAN NORMAL

## 2025-02-01 LAB — TRYPTASE: 6.1 UG/L

## 2025-02-06 LAB — C2 COMPLEMENT: 2.7 MG/DL

## 2025-02-07 ENCOUNTER — TELEPHONE (OUTPATIENT)
Dept: ALLERGY | Facility: CLINIC | Age: 89
End: 2025-02-07

## 2025-02-07 NOTE — TELEPHONE ENCOUNTER
Spoke with patient to schedule follow up appointment    Informed patient Dr. Wilcox would like to discuss test results at follow up appointment    Follow up scheduled for 2/18 at 10am

## 2025-02-18 ENCOUNTER — NURSE ONLY (OUTPATIENT)
Dept: ALLERGY | Facility: CLINIC | Age: 89
End: 2025-02-18

## 2025-02-18 ENCOUNTER — OFFICE VISIT (OUTPATIENT)
Dept: ALLERGY | Facility: CLINIC | Age: 89
End: 2025-02-18

## 2025-02-18 VITALS
SYSTOLIC BLOOD PRESSURE: 115 MMHG | DIASTOLIC BLOOD PRESSURE: 72 MMHG | HEART RATE: 78 BPM | OXYGEN SATURATION: 95 % | RESPIRATION RATE: 20 BRPM

## 2025-02-18 DIAGNOSIS — T46.4X5A ANGIOEDEMA DUE TO ANGIOTENSIN CONVERTING ENZYME INHIBITOR (ACE-I): ICD-10-CM

## 2025-02-18 DIAGNOSIS — J30.89 SEASONAL AND PERENNIAL ALLERGIC RHINOCONJUNCTIVITIS: Primary | ICD-10-CM

## 2025-02-18 DIAGNOSIS — J30.2 SEASONAL AND PERENNIAL ALLERGIC RHINOCONJUNCTIVITIS: Primary | ICD-10-CM

## 2025-02-18 DIAGNOSIS — H10.10 SEASONAL AND PERENNIAL ALLERGIC RHINOCONJUNCTIVITIS: Primary | ICD-10-CM

## 2025-02-18 DIAGNOSIS — T78.2XXD ANAPHYLAXIS, SUBSEQUENT ENCOUNTER: Primary | ICD-10-CM

## 2025-02-18 DIAGNOSIS — Z91.018 FOOD ALLERGY: ICD-10-CM

## 2025-02-18 DIAGNOSIS — T78.3XXA ANGIOEDEMA DUE TO ANGIOTENSIN CONVERTING ENZYME INHIBITOR (ACE-I): ICD-10-CM

## 2025-02-18 DIAGNOSIS — T78.3XXD ANGIOEDEMA, SUBSEQUENT ENCOUNTER: ICD-10-CM

## 2025-02-18 DIAGNOSIS — Z91.012 EGG ALLERGY: ICD-10-CM

## 2025-02-18 DIAGNOSIS — Z88.6 ALLERGY TO NONSTEROIDAL ANTI-INFLAMMATORY DRUG (NSAID): ICD-10-CM

## 2025-02-18 PROCEDURE — 95004 PERQ TESTS W/ALRGNC XTRCS: CPT | Performed by: ALLERGY & IMMUNOLOGY

## 2025-02-18 PROCEDURE — 99214 OFFICE O/P EST MOD 30 MIN: CPT | Performed by: ALLERGY & IMMUNOLOGY

## 2025-02-18 NOTE — PATIENT INSTRUCTIONS
Patient will avoid all ACE inhibitors and angiotensin receptor blockers.    Patient will avoid high-dose aspirin, and all traditional nonsteroidal anti-inflammatory drugs, including ibuprofen, Advil, Motrin, Nuprin, Aleve, Naprosyn, naproxen, Clinoril, Feldene, Voltaren, etc.    Patient should tolerate Tylenol/acetaminophen.  Patient should tolerate Chambers 2 inhibitor nonsteroidal anti-inflammatory drugs, including Mobic or Celebrex-would need prescription from primary care physician.    Today we will perform allergy skin test to milk, corn, wheat, peanuts, egg whites, egg yolk.  Patient has recently exhibited questionable results on food RAST testing on December 23, 2024.  Patient was completely negative on skin test to above foods tested.  However, patient was taking an antidepressant which can attenuate that the skin testing downward.    Therefore, we will recommend avoiding eggs due to the class IV RAST test.  I believe the remainder of the RAST test were false positives she should tolerate milk, corn, wheat, peanuts.    Patient will follow-up in my [place] allergy clinic as needed only.

## 2025-02-18 NOTE — PROGRESS NOTES
Kalani Reyes is a 89 year old female.    HPI:     Chief Complaint   Patient presents with    Allergies     Patient here for a follow up with Dr. Wilcox to go over test results. Patient states she would like to be tested for food allergies per a dieticians recommendation, patient states she is not taking any allergy medication.     89-year-old female now visits for 2 complex issues:  1) Patient had a recent history of anaphylaxis/angioedema after taking her morning valsartan followed by unusually high dose naproxen.  Both medications are renowned for causing angioedema/anaphylaxis without hives.  The combination was undoubtedly the cause of her angioedema episode.  We formally recommended that patient avoid all ACE inhibitors and angiotensin receptor blockers as well as traditional nonsteroidal anti-inflammatory drugs-see previous consult.    But, to be thorough, we send patient for C2, C4, C1 esterase inhibitor levels to make sure a patient of this age did not have occult lymphoma, leukemia, multiple myeloma, or monoclonal myeloma which can cause angioedema in this age group.  Today we reviewed results of those serology tests, which were completely normal.    2) patient was now referred by her dietitian for evaluation of possible food allergy found on December 23, 2020 for food RAST, which were borderline positive to milk, corn, wheat; class I to peanuts; class IV to eggs.    The RAST tests are most likely predominantly false positive results, though the class IV to eggs is somewhat concerning.    Today we are allergy skin test patient to milk, corn, wheat, peanuts, egg yolk, and egg white.  Patient has been off all allergy medications for more than 5 days.        HISTORY:  Past Medical History:    DJD (degenerative joint disease) of knee    DJD knees    High blood pressure    High cholesterol    History of colonic polyps    10/2015, small polypectomy.  Consider repeat 2020, though difficult exam    Obesity,  unspecified    Osteoarthritis    Osteopenia    BMD 2006 T -1.7, 2012 -1.6    Osteoporosis    osteoporosis lumbar spine    Primary osteoarthritis involving multiple joints    Unspecified essential hypertension      History reviewed. No pertinent surgical history.   Family History   Problem Relation Age of Onset    Other (Other) Father     Other (Other) Mother       Social History:   Social History     Socioeconomic History    Marital status: Single   Tobacco Use    Smoking status: Never     Passive exposure: Past    Smokeless tobacco: Never   Vaping Use    Vaping status: Never Used   Substance and Sexual Activity    Alcohol use: No     Alcohol/week: 0.0 standard drinks of alcohol     Comment: occasionally    Drug use: No     Social Drivers of Health      [facility], [facility]    Housing Stability        Medications (Active prior to today's visit):  Current Outpatient Medications   Medication Sig Dispense Refill    amLODIPine 5 MG Oral Tab Take 1 tablet (5 mg total) by mouth daily. 90 tablet 3    simvastatin 20 MG Oral Tab Take 1 tablet (20 mg total) by mouth nightly.      Betahistine HCl (BETAHISTINE DIHYDROCHLORIDE) Does not apply Powder Take 16 mg by mouth in the morning, at noon, and at bedtime. Po TID per ENT 16 MG CAPSULE. Use [facility] 270 each 1    alendronate 70 MG Oral Tab Take 1 tablet (70 mg total) by mouth every 7 days. 12 tablet 3    DULoxetine 30 MG Oral Cap DR Particles Take 1 capsule (30 mg total) by mouth daily. 90 capsule 3    triamcinolone 0.1 % External Cream Apply topically 2 (two) times daily as needed. (Patient not taking: Reported on 1/28/2025) 60 g 3    Turmeric 400 MG Oral Cap Take 1 capsule by mouth 3 (three) times daily. (Patient not taking: Reported on 1/28/2025) 90 capsule 0    Calcium 500 MG Oral Tab Take  by mouth. (Patient not taking: Reported on 1/28/2025)         Allergies:  Allergies[1]      ROS:   Review of Systems    Constitutional:  Negative for activity change, appetite change, chills, diaphoresis, fatigue, fever and unexpected weight change.   HENT:  Negative for congestion, ear discharge, ear pain, facial swelling, hearing loss, mouth sores, postnasal drip, rhinorrhea, sinus pressure, sinus pain, sneezing, sore throat, tinnitus, trouble swallowing and voice change.    Eyes:  Negative for pain, discharge, redness and itching.   Respiratory:  Negative for apnea, cough, choking, chest tightness, shortness of breath, wheezing and stridor.    Cardiovascular:  Negative for chest pain and palpitations.   Gastrointestinal:  Negative for abdominal distention, abdominal pain, constipation, diarrhea, nausea and vomiting.   Endocrine: Negative for cold intolerance and heat intolerance.   Musculoskeletal:  Negative for arthralgias, joint swelling and myalgias.   Skin:  Negative for pallor and rash.   Allergic/Immunologic: Positive for food allergies. Negative for environmental allergies and immunocompromised state.   Neurological:  Negative for headaches.   Psychiatric/Behavioral:  Negative for behavioral problems and sleep disturbance.           PHYSICAL EXAM:   Physical Exam  Constitutional:       Appearance: She is normal weight.   HENT:      Head: No right periorbital erythema or left periorbital erythema.      Right Ear: Tympanic membrane normal. There is no impacted cerumen.      Left Ear: Tympanic membrane normal. There is no impacted cerumen.      Nose: No congestion or rhinorrhea.      Mouth/Throat:      Pharynx: No oropharyngeal exudate or posterior oropharyngeal erythema.   Eyes:      General: Lids are normal. No allergic shiner.        Right eye: No discharge.         Left eye: No discharge.      Conjunctiva/sclera: Conjunctivae normal.      Right eye: Right conjunctiva is not injected. No exudate.     Left eye: Left conjunctiva is not injected. No exudate.  Cardiovascular:      Rate and Rhythm: Normal rate and regular  rhythm.      Heart sounds: Normal heart sounds.   Pulmonary:      Effort: No tachypnea, prolonged expiration or respiratory distress.      Breath sounds: No stridor or decreased air movement. No decreased breath sounds, wheezing, rhonchi or rales.   Skin:     Coloration: Skin is not cyanotic or pale.      Findings: No acne, ecchymosis, erythema, petechiae or rash. Rash is not macular, nodular, papular, purpuric, pustular, scaling, urticarial or vesicular.           ASSESSMENT   Assessment   Encounter Diagnoses   Name Primary?    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    Patient will avoid all traditional nonsteroidal anti-inflammatory drugs, including high dose aspirin, ibuprofen, Advil, Motrin, Aleve, Naprosyn, naproxen, Clinoril, Feldene, Voltaren, etc.    Patient should tolerate Tylenol/acetaminophen.  Patient should tolerate Chambers 2 inhibitor nonsteroidal inflammatory drugs, including Celebrex or Mobic.    Due to questionable results on a food RAST test, today we will perform allergy skin test to foods in question, including milk, corn, wheat, peanuts, egg whites, egg yolk.       Orders This Visit:  No orders of the defined types were placed in this encounter.      Meds This Visit:  Requested Prescriptions      No prescriptions requested or ordered in this encounter       Imaging & Referrals:  None     2/18/2025  Soy Wilcox MD      If medication samples were provided today, they were provided solely for patient education and training related to self administration of these medications.  Teaching, instruction and sample was provided to the patient by myself.  Teaching included  a review of potential adverse side effects as well as  potential efficacy.  Patient's questions were answered in regards to medication administration and dosing and potential side effects. Teaching was provided via the teach back method       [1]   Allergies  Allergen Reactions    Naproxen ANAPHYLAXIS    Valsartan-Hydrochlorothiazide ANAPHYLAXIS and ANGIOEDEMA

## 2025-03-05 ENCOUNTER — OFFICE VISIT (OUTPATIENT)
Dept: FAMILY MEDICINE CLINIC | Facility: CLINIC | Age: 89
End: 2025-03-05

## 2025-03-05 VITALS
BODY MASS INDEX: 38 KG/M2 | DIASTOLIC BLOOD PRESSURE: 75 MMHG | HEART RATE: 51 BPM | OXYGEN SATURATION: 94 % | WEIGHT: 178 LBS | SYSTOLIC BLOOD PRESSURE: 145 MMHG

## 2025-03-05 DIAGNOSIS — M67.449 GANGLION CYST OF FINGER: ICD-10-CM

## 2025-03-05 DIAGNOSIS — I10 ESSENTIAL HYPERTENSION: Primary | ICD-10-CM

## 2025-03-05 DIAGNOSIS — M15.0 PRIMARY OSTEOARTHRITIS INVOLVING MULTIPLE JOINTS: ICD-10-CM

## 2025-03-05 DIAGNOSIS — T78.2XXD ANAPHYLAXIS, SUBSEQUENT ENCOUNTER: ICD-10-CM

## 2025-03-05 PROCEDURE — G2211 COMPLEX E/M VISIT ADD ON: HCPCS | Performed by: FAMILY MEDICINE

## 2025-03-05 PROCEDURE — 99213 OFFICE O/P EST LOW 20 MIN: CPT | Performed by: FAMILY MEDICINE

## 2025-03-05 RX ORDER — FUROSEMIDE 20 MG/1
20 TABLET ORAL DAILY
Qty: 90 TABLET | Refills: 1 | Status: SHIPPED | OUTPATIENT
Start: 2025-03-05

## 2025-03-05 NOTE — PROGRESS NOTES
Subjective:   Patient ID: Kalani Reyes is a 89 year old female.    Hypertension  This is a chronic problem. The current episode started more than 1 year ago. Pertinent negatives include no chest pain or headaches. There are no associated agents to hypertension. Risk factors for coronary artery disease include obesity and post-menopausal state. Past treatments include calcium channel blockers.       History/Other:   Review of Systems   Cardiovascular:  Negative for chest pain.   Neurological:  Negative for headaches.     Current Outpatient Medications   Medication Sig Dispense Refill    furosemide 20 MG Oral Tab Take 1 tablet (20 mg total) by mouth daily. 90 tablet 1    amLODIPine 5 MG Oral Tab Take 1 tablet (5 mg total) by mouth daily. 90 tablet 3    simvastatin 20 MG Oral Tab Take 1 tablet (20 mg total) by mouth nightly.      Betahistine HCl (BETAHISTINE DIHYDROCHLORIDE) Does not apply Powder Take 16 mg by mouth in the morning, at noon, and at bedtime. Po TID per ENT 16 MG CAPSULE. Use [facility] 270 each 1    alendronate 70 MG Oral Tab Take 1 tablet (70 mg total) by mouth every 7 days. 12 tablet 3    DULoxetine 30 MG Oral Cap DR Particles Take 1 capsule (30 mg total) by mouth daily. 90 capsule 3    Turmeric 400 MG Oral Cap Take 1 capsule by mouth 3 (three) times daily. 90 capsule 0     Allergies:Allergies[1]    Objective:   Physical Exam  Constitutional:       Appearance: Normal appearance.   Cardiovascular:      Rate and Rhythm: Normal rate and regular rhythm.      Pulses: Normal pulses.   Pulmonary:      Effort: Pulmonary effort is normal.      Breath sounds: Normal breath sounds.   Musculoskeletal:      Comments: Trace nonpitting ankle edema bilaterally   Neurological:      Mental Status: She is alert.         Assessment & Plan:   1. Essential hypertension-blood pressure slightly above goal with amlodipine 5 mg daily.  Valsartan/HCT was discontinued due to anaphylaxis episode below.  At this time plan to  start furosemide 20 mg daily reviewed instructions and side effects of medication.  High potassium diet.  She will call us in about 3 weeks with home blood pressure readings.  If not improved consider adding carvedilol.   2. Anaphylaxis, subsequent encounter-12/22/2025 sudden onset of swelling extremities and mouth.  Admitted to [facility].  See previous visit.  Subsequently she saw allergist Dr. Soy Wilcox.  Allergy testing positive for eggs however he was most suspicious for the combination of valsartan and naproxen.  Plan to avoid ACE inhibitors/ARB's, NSAIDs, uncooked/undercooked eggs (has had cooked eggs for years without an issue.  Also has stopped baby ASA.  She has not gotten EpiPen due to cost.  Working with RN today on affordable options.   3. Ganglion cyst of finger-left hand flexor surface near PIP.  Asymptomatic.  No further treatment needed.   4. Primary osteoarthritis involving multiple joints-knee hyaluronic acid injections have not been helpful.  Received steroid injection which provided some relief.  She will also discuss possible steroid injections of shoulders with orthopedics.       No orders of the defined types were placed in this encounter.      Meds This Visit:  Requested Prescriptions     Signed Prescriptions Disp Refills    furosemide 20 MG Oral Tab 90 tablet 1     Sig: Take 1 tablet (20 mg total) by mouth daily.       Imaging & Referrals:  None         [1]   Allergies  Allergen Reactions    Naproxen ANAPHYLAXIS    Valsartan-Hydrochlorothiazide ANAPHYLAXIS and ANGIOEDEMA

## 2025-03-07 ENCOUNTER — TELEPHONE (OUTPATIENT)
Dept: FAMILY MEDICINE CLINIC | Facility: CLINIC | Age: 89
End: 2025-03-07

## 2025-03-07 NOTE — TELEPHONE ENCOUNTER
Patient was told by doctor to schedule 3 wk follow up to check in blood pressure medication. Patient is requesting an appointment. Specifically a morning appointment so she can have a ride.

## 2025-03-25 ENCOUNTER — OFFICE VISIT (OUTPATIENT)
Dept: FAMILY MEDICINE CLINIC | Facility: CLINIC | Age: 89
End: 2025-03-25

## 2025-03-25 VITALS
DIASTOLIC BLOOD PRESSURE: 92 MMHG | BODY MASS INDEX: 38 KG/M2 | HEART RATE: 77 BPM | SYSTOLIC BLOOD PRESSURE: 153 MMHG | OXYGEN SATURATION: 98 % | WEIGHT: 178 LBS

## 2025-03-25 DIAGNOSIS — E78.5 HYPERLIPIDEMIA, UNSPECIFIED HYPERLIPIDEMIA TYPE: ICD-10-CM

## 2025-03-25 DIAGNOSIS — I10 ESSENTIAL HYPERTENSION: Primary | ICD-10-CM

## 2025-03-25 PROCEDURE — G2211 COMPLEX E/M VISIT ADD ON: HCPCS | Performed by: FAMILY MEDICINE

## 2025-03-25 PROCEDURE — 99213 OFFICE O/P EST LOW 20 MIN: CPT | Performed by: FAMILY MEDICINE

## 2025-03-25 RX ORDER — CARVEDILOL 6.25 MG/1
6.25 TABLET ORAL 2 TIMES DAILY WITH MEALS
Qty: 180 TABLET | Refills: 0 | Status: SHIPPED | OUTPATIENT
Start: 2025-03-25

## 2025-03-25 RX ORDER — ROSUVASTATIN CALCIUM 5 MG/1
5 TABLET, COATED ORAL NIGHTLY
Qty: 90 TABLET | Refills: 3 | Status: SHIPPED | OUTPATIENT
Start: 2025-03-25

## 2025-03-26 NOTE — PROGRESS NOTES
Subjective:   Kalani Reyes is a 89 year old female who presents for Follow - Up (3 week blood pressure F/u. Pt states BP is still high at home. )       History/Other:   History of Present Illness  The patient, with a history of hypertension, presents with persistently high blood pressure despite current medication. She has been monitoring her blood pressure at home daily, noting that the systolic reading is consistently over 150 and the diastolic over 80. The patient also reports occasional headaches.    In addition to hypertension, the patient has been experiencing weakness in the knees and muscle issues. She has been on simvastatin for a long time and suspects it might be contributing to her muscle discomfort.    The patient also recounts a recent encounter with another doctor regarding an allergy test. She expresses frustration with the lack of communication and the delay in getting the test done.      Chief Complaint Reviewed and Verified  Nursing Notes Reviewed and   Verified  Tobacco Reviewed  Allergies Reviewed  Medications Reviewed    Problem List Reviewed  OB Status Reviewed       Tobacco:  She has never smoked tobacco.    Current Outpatient Medications   Medication Sig Dispense Refill    carvedilol 6.25 MG Oral Tab Take 1 tablet (6.25 mg total) by mouth 2 (two) times daily with meals. 180 tablet 0    rosuvastatin 5 MG Oral Tab Take 1 tablet (5 mg total) by mouth nightly. 90 tablet 3    furosemide 20 MG Oral Tab Take 1 tablet (20 mg total) by mouth daily. 90 tablet 1    amLODIPine 5 MG Oral Tab Take 1 tablet (5 mg total) by mouth daily. 90 tablet 3    Betahistine HCl (BETAHISTINE DIHYDROCHLORIDE) Does not apply Powder Take 16 mg by mouth in the morning, at noon, and at bedtime. Po TID per ENT 16 MG CAPSULE. Use [facility] pharmacy 270 each 1    alendronate 70 MG Oral Tab Take 1 tablet (70 mg total) by mouth every 7 days. 12 tablet 3    DULoxetine 30 MG Oral Cap DR Particles Take 1 capsule (30 mg total)  by mouth daily. 90 capsule 3    Turmeric 400 MG Oral Cap Take 1 capsule by mouth 3 (three) times daily. 90 capsule 0              Review of Systems:  See above      Objective:   BP (!) 153/92   Pulse 77   Wt 178 lb (80.7 kg)   LMP  (LMP Unknown)   SpO2 98%   BMI 37.88 kg/m²    Estimated body mass index is 37.88 kg/m² as calculated from the following:    Height as of 8/30/24: 4' 9.48\" (1.46 m).    Weight as of this encounter: 178 lb (80.7 kg).  Results  RADIOLOGY  No results found.       Physical Exam  Physical Exam  GENERAL: Anxious appearance  CHEST: Lungs clear to auscultation  CARDIOVASCULAR: Regular rate and rhythm with 2/6 systolic ejection murmur  EXTREMITIES: 1+ non-pitting edema in extremities      Assessment & Plan:   1. Essential hypertension (Primary)  2. Hyperlipidemia, unspecified hyperlipidemia type  Other orders  -     Carvedilol; Take 1 tablet (6.25 mg total) by mouth 2 (two) times daily with meals.  Dispense: 180 tablet; Refill: 0  -     Rosuvastatin Calcium; Take 1 tablet (5 mg total) by mouth nightly.  Dispense: 90 tablet; Refill: 3      Assessment & Plan  Assessment & Plan  Essential Hypertension  Blood pressure remains elevated above 150/80 mmHg, indicating inadequate control with current medications.  - Add additional antihypertensive medication carvedilol twice daily with meals.  - Continue furosemide and amlodipine.  - Instruct her to monitor blood pressure at home and report readings in two weeks.  - Schedule follow-up in two months.    Hyperlipidemia  Change to rosuvastatin recommended due to potential drug interactions with antihypertensive medication and muscle pain from simvastatin.  - Discontinue simvastatin.  - Initiate rosuvastatin 5 mg daily.  - Monitor for muscle pain and adjust treatment if necessary.    Primary Osteoarthritis  Knee pain and weakness affect mobility and daily activities.  - Encourage use of a cane for stability and support.            Return for CALL ME IN 2  WEEKS WITH HOME BP READINGS. GIVE 3 READINGS FROM PAST 3 DAYS.      Shayla Pierre MD

## 2025-04-02 RX ORDER — DULOXETIN HYDROCHLORIDE 30 MG/1
30 CAPSULE, DELAYED RELEASE ORAL DAILY
Qty: 90 CAPSULE | Refills: 3 | Status: SHIPPED | OUTPATIENT
Start: 2025-04-02

## 2025-04-08 ENCOUNTER — TELEPHONE (OUTPATIENT)
Dept: FAMILY MEDICINE CLINIC | Facility: CLINIC | Age: 89
End: 2025-04-08

## 2025-04-08 RX ORDER — CARVEDILOL 12.5 MG/1
25 TABLET ORAL 2 TIMES DAILY WITH MEALS
Qty: 90 TABLET | Refills: 3 | Status: SHIPPED | OUTPATIENT
Start: 2025-04-08 | End: 2026-04-03

## 2025-04-08 NOTE — TELEPHONE ENCOUNTER
Recommend increasing carvedilol.  I will send new Rx to pharmacy.  Continue amlodipine and furosemide in addition to other medications.  Call in 1 week with update on home blood pressure please also give heart rate at that time.

## 2025-04-08 NOTE — TELEPHONE ENCOUNTER
Patient calling to state that blood pressure has still been elevated. Most recent reading was 150/80. Patient is still experiencing light headedness, fatigue and dizziness. Would like further recommendations.

## 2025-04-08 NOTE — TELEPHONE ENCOUNTER
Spoke to patient, full name and date of birth verified.  Informed patient of message from Dr. Pierre and new prescription sent increasing the dose of carvedilol.     Patient verbalized understanding, she will call with update in 1 week.

## 2025-04-09 ENCOUNTER — TELEPHONE (OUTPATIENT)
Dept: FAMILY MEDICINE CLINIC | Facility: CLINIC | Age: 89
End: 2025-04-09

## 2025-04-09 NOTE — TELEPHONE ENCOUNTER
Spoke with son, Amanda.  Reports that BP medication was changed. Unclear on which medications to take.  Reviewed the note from [facility] with him. Will take all the medications and contact [facility] in one week.

## 2025-04-17 ENCOUNTER — TELEPHONE (OUTPATIENT)
Dept: FAMILY MEDICINE CLINIC | Facility: CLINIC | Age: 89
End: 2025-04-17

## 2025-04-17 NOTE — TELEPHONE ENCOUNTER
NURIA Dubose...    Patient calling (verified full name and date of birth).  Patient wanted to provide update on her blood pressure  Patient checked her blood pressure this morning and it was 136/63  Asked to keep blood pressure log and bring to next office visit  Taking Carvedilol 25 mgs BID as prescribed  Has follow up appointment schedule below  Patient stated \"I am feeling good\"    Future Appointments   Date Time Provider Department Center   5/1/2025 11:00 AM Shayla Pierre MD [facility]

## 2025-04-24 ENCOUNTER — NURSE TRIAGE (OUTPATIENT)
Dept: FAMILY MEDICINE CLINIC | Facility: CLINIC | Age: 89
End: 2025-04-24

## 2025-04-24 ENCOUNTER — HOSPITAL ENCOUNTER (OUTPATIENT)
Age: 89
Discharge: HOME | End: 2025-04-24
Payer: MEDICARE

## 2025-04-24 VITALS
DIASTOLIC BLOOD PRESSURE: 61 MMHG | SYSTOLIC BLOOD PRESSURE: 141 MMHG | OXYGEN SATURATION: 96 % | RESPIRATION RATE: 16 BRPM | TEMPERATURE: 98 F | HEART RATE: 72 BPM

## 2025-04-24 DIAGNOSIS — B35.4 TINEA CORPORIS: Primary | ICD-10-CM

## 2025-04-24 PROCEDURE — 99203 OFFICE O/P NEW LOW 30 MIN: CPT | Performed by: NURSE PRACTITIONER

## 2025-04-24 RX ORDER — CLOTRIMAZOLE AND BETAMETHASONE DIPROPIONATE 10; .64 MG/G; MG/G
1 CREAM TOPICAL 2 TIMES DAILY
Qty: 45 G | Refills: 1 | Status: SHIPPED | OUTPATIENT
Start: 2025-04-24 | End: 2025-05-08

## 2025-04-24 NOTE — ED PROVIDER NOTES
Patient Seen in: [facility]      History     Chief Complaint   Patient presents with    Derm Problem    Swelling Edema     Stated Complaint: R Ankle Pain    Subjective:   HPI  Patient is an 89-year-old female who presents to the immediate care center with primary concern for rash to the medial aspect of her right ankle.  This has been present for approximately 1 month.  It is minimally increased in size during this time.  She describes it as itchy.  She has secondary concern and that she has had swelling bilaterally in her lower extremity for several months.  She denies motor or sensory deficits; no difficulty walking; no recent illness or trauma.        History of Present Illness               Objective:     Past Medical History:    DJD (degenerative joint disease) of knee    DJD knees    High blood pressure    High cholesterol    History of colonic polyps    10/2015, small polypectomy.  Consider repeat 2020, though difficult exam    Obesity, unspecified    Osteoarthritis    Osteopenia    BMD 2006 T -1.7, 2012 -1.6    Osteoporosis    osteoporosis lumbar spine    Primary osteoarthritis involving multiple joints    Unspecified essential hypertension              History reviewed. No pertinent surgical history.             Social History     Socioeconomic History    Marital status: Single   Tobacco Use    Smoking status: Never     Passive exposure: Past    Smokeless tobacco: Never   Vaping Use    Vaping status: Never Used   Substance and Sexual Activity    Alcohol use: No     Alcohol/week: 0.0 standard drinks of alcohol     Comment: occasionally    Drug use: No     Social Drivers of Health      Received [facility]    Housing Stability              Review of Systems   Constitutional:  Negative for chills and fever.   Respiratory:  Negative for cough.    Cardiovascular:  Negative for chest pain.   Skin:  Positive for rash.   Neurological:  Negative for weakness and numbness.        Positive for stated complaint: R Ankle Pain  Other systems are as noted in HPI.  Constitutional and vital signs reviewed.      All other systems reviewed and negative except as noted above.                  Physical Exam     ED Triage Vitals [04/24/25 1020]   /61   Pulse 72   Resp 16   Temp 97.8 °F (36.6 °C)   Temp src Oral   SpO2 96 %   O2 Device None (Room air)       Current Vitals:   Vital Signs  BP: 141/61  Pulse: 72  Resp: 16  Temp: 97.8 °F (36.6 °C)  Temp src: Oral    Oxygen Therapy  SpO2: 96 %  O2 Device: None (Room air)        Physical Exam  Vitals and nursing note reviewed.   Constitutional:       General: She is not in acute distress.  Cardiovascular:      Pulses: Normal pulses.   Musculoskeletal:         General: Swelling (equal bilaterally) present.   Skin:     General: Skin is warm and dry.      Findings: Rash (anular lesion 2 cm x 4 cm with central sparing to medial aspect of ankle) present.   Neurological:      Mental Status: She is alert and oriented to person, place, and time.   Psychiatric:         Behavior: Behavior normal.           Physical Exam                ED Course   Labs Reviewed - No data to display       Results                                 MDM      Patient was advised that the rash on examination is consistent with tinea.  She was provided with prescription for Lotrisone that she will apply daily.  She was encouraged to schedule follow-up appointment with her primary care provider to monitor the progress of this rash next week as well as to consider her subacute leg swelling.  She states understanding and agrees with plan.            Medical Decision Making  Differential diagnoses considered today include, but are not exclusive of: Tinea corporis, cellulitis, contact dermatitis.    Problems Addressed:  Tinea corporis: self-limited or minor problem    Risk  Prescription drug management.        Disposition and Plan     Clinical Impression:  1. Tinea corporis          Disposition:  Discharge  4/24/2025 10:41 am    Follow-up:  Shayla Pierre MD  [address] [place] [address]  [phone]    Schedule an appointment as soon as possible for a visit in 1 week            Medications Prescribed:  Discharge Medication List as of 4/24/2025 10:41 AM        START taking these medications    Details   clotrimazole-betamethasone 1-0.05 % External Cream Apply 1 Application topically 2 (two) times daily for 14 days., Normal, Disp-45 g, R-1             Supplementary Documentation:

## 2025-04-24 NOTE — ED INITIAL ASSESSMENT (HPI)
Pt states having a rash on right lower leg for a month and began having lower leg swelling. Pt states recently changed her BP meditation and unsure if that's what causing the issues.

## 2025-04-24 NOTE — TELEPHONE ENCOUNTER
Action Requested: Summary for Provider     []  Critical Lab, Recommendations Needed  [] Need Additional Advice  []   FYI    []   Need Orders  [] Need Medications Sent to Pharmacy  []  Other     SUMMARY: Per protocol advised : Office visit     Patient declines offer of office visit will go to [facility]     Reason for call: Swelling Edema  Onset: Data Unavailable    Patient and son Pat  calling ( name and date of birth of patient verified ) patient has noticed  new edema to both legs but right is worse , edema is noted from ankle to calf , also noted red-purple blotches that itch    Patient does take Amlodipine     No FM appointments available at [facility] , Patient declined office visit at another location     Will go to [facility]     Provided locations/ hours and wait times for [facility]      Reason for Disposition   MODERATE swelling of both ankles (e.g., swelling extends up to the knees) AND new-onset or worsening    Protocols used: Leg Swelling and Edema-A-OH

## 2025-05-01 ENCOUNTER — LAB ENCOUNTER (OUTPATIENT)
Dept: LAB | Age: 89
End: 2025-05-01
Attending: FAMILY MEDICINE
Payer: MEDICARE

## 2025-05-01 ENCOUNTER — OFFICE VISIT (OUTPATIENT)
Dept: FAMILY MEDICINE CLINIC | Facility: CLINIC | Age: 89
End: 2025-05-01

## 2025-05-01 VITALS
WEIGHT: 182 LBS | HEIGHT: 58.27 IN | SYSTOLIC BLOOD PRESSURE: 143 MMHG | HEART RATE: 65 BPM | BODY MASS INDEX: 37.69 KG/M2 | OXYGEN SATURATION: 6 % | DIASTOLIC BLOOD PRESSURE: 81 MMHG

## 2025-05-01 DIAGNOSIS — R53.83 OTHER FATIGUE: ICD-10-CM

## 2025-05-01 DIAGNOSIS — R73.03 PREDIABETES: ICD-10-CM

## 2025-05-01 DIAGNOSIS — G47.33 OSA (OBSTRUCTIVE SLEEP APNEA): ICD-10-CM

## 2025-05-01 DIAGNOSIS — E21.0 PRIMARY HYPERPARATHYROIDISM (HCC): ICD-10-CM

## 2025-05-01 DIAGNOSIS — E78.2 MIXED HYPERLIPIDEMIA: ICD-10-CM

## 2025-05-01 DIAGNOSIS — F33.1 MAJOR DEPRESSIVE DISORDER, RECURRENT, MODERATE (HCC): ICD-10-CM

## 2025-05-01 DIAGNOSIS — H81.09 MENIERE'S DISEASE, UNSPECIFIED LATERALITY: ICD-10-CM

## 2025-05-01 DIAGNOSIS — M85.80 OSTEOPENIA, UNSPECIFIED LOCATION: ICD-10-CM

## 2025-05-01 DIAGNOSIS — I44.7 LEFT BUNDLE BRANCH BLOCK (LBBB): ICD-10-CM

## 2025-05-01 DIAGNOSIS — E66.812 CLASS 2 SEVERE OBESITY DUE TO EXCESS CALORIES WITH SERIOUS COMORBIDITY AND BODY MASS INDEX (BMI) OF 38.0 TO 38.9 IN ADULT (HCC): ICD-10-CM

## 2025-05-01 DIAGNOSIS — Z87.892 HISTORY OF ANAPHYLAXIS: ICD-10-CM

## 2025-05-01 DIAGNOSIS — I10 HYPERTENSION, BENIGN: ICD-10-CM

## 2025-05-01 DIAGNOSIS — E66.01 CLASS 2 SEVERE OBESITY DUE TO EXCESS CALORIES WITH SERIOUS COMORBIDITY AND BODY MASS INDEX (BMI) OF 38.0 TO 38.9 IN ADULT (HCC): ICD-10-CM

## 2025-05-01 DIAGNOSIS — H91.13 PRESBYCUSIS OF BOTH EARS: ICD-10-CM

## 2025-05-01 DIAGNOSIS — Z00.00 ENCOUNTER FOR ANNUAL HEALTH EXAMINATION: Primary | ICD-10-CM

## 2025-05-01 DIAGNOSIS — N18.31 CHRONIC KIDNEY DISEASE, STAGE 3A (HCC): ICD-10-CM

## 2025-05-01 LAB
ALBUMIN SERPL-MCNC: 4.7 G/DL (ref 3.2–4.8)
ALBUMIN/GLOB SERPL: 1.7 {RATIO} (ref 1–2)
ALP LIVER SERPL-CCNC: 74 U/L (ref 55–142)
ALT SERPL-CCNC: 18 U/L (ref 10–49)
ANION GAP SERPL CALC-SCNC: 11 MMOL/L (ref 0–18)
AST SERPL-CCNC: 22 U/L (ref ?–34)
BILIRUB SERPL-MCNC: 0.7 MG/DL (ref 0.2–1.1)
BILIRUB UR QL: NEGATIVE
BUN BLD-MCNC: 22 MG/DL (ref 9–23)
BUN/CREAT SERPL: 21.4 (ref 10–20)
CALCIUM BLD-MCNC: 10 MG/DL (ref 8.7–10.4)
CHLORIDE SERPL-SCNC: 104 MMOL/L (ref 98–112)
CHOLEST SERPL-MCNC: 152 MG/DL (ref ?–200)
CLARITY UR: CLEAR
CO2 SERPL-SCNC: 25 MMOL/L (ref 21–32)
CREAT BLD-MCNC: 1.03 MG/DL (ref 0.55–1.02)
DEPRECATED RDW RBC AUTO: 47.8 FL (ref 35.1–46.3)
EGFRCR SERPLBLD CKD-EPI 2021: 52 ML/MIN/1.73M2 (ref 60–?)
ERYTHROCYTE [DISTWIDTH] IN BLOOD BY AUTOMATED COUNT: 14.4 % (ref 11–15)
EST. AVERAGE GLUCOSE BLD GHB EST-MCNC: 123 MG/DL (ref 68–126)
FASTING PATIENT LIPID ANSWER: YES
FASTING STATUS PATIENT QL REPORTED: YES
GLOBULIN PLAS-MCNC: 2.8 G/DL (ref 2–3.5)
GLUCOSE BLD-MCNC: 110 MG/DL (ref 70–99)
GLUCOSE UR-MCNC: NORMAL MG/DL
HBA1C MFR BLD: 5.9 % (ref ?–5.7)
HCT VFR BLD AUTO: 42.5 % (ref 35–48)
HDLC SERPL-MCNC: 56 MG/DL (ref 40–59)
HGB BLD-MCNC: 13.6 G/DL (ref 12–16)
HGB UR QL STRIP.AUTO: NEGATIVE
KETONES UR-MCNC: NEGATIVE MG/DL
LDLC SERPL CALC-MCNC: 72 MG/DL (ref ?–100)
LEUKOCYTE ESTERASE UR QL STRIP.AUTO: NEGATIVE
MCH RBC QN AUTO: 29 PG (ref 26–34)
MCHC RBC AUTO-ENTMCNC: 32 G/DL (ref 31–37)
MCV RBC AUTO: 90.6 FL (ref 80–100)
NITRITE UR QL STRIP.AUTO: NEGATIVE
NONHDLC SERPL-MCNC: 96 MG/DL (ref ?–130)
OSMOLALITY SERPL CALC.SUM OF ELEC: 294 MOSM/KG (ref 275–295)
PH UR: 5 [PH] (ref 5–8)
PLATELET # BLD AUTO: 264 10(3)UL (ref 150–450)
POTASSIUM SERPL-SCNC: 4.4 MMOL/L (ref 3.5–5.1)
PROT SERPL-MCNC: 7.5 G/DL (ref 5.7–8.2)
PROT UR-MCNC: NEGATIVE MG/DL
PTH-INTACT SERPL-MCNC: 117.3 PG/ML (ref 18.5–88)
RBC # BLD AUTO: 4.69 X10(6)UL (ref 3.8–5.3)
SODIUM SERPL-SCNC: 140 MMOL/L (ref 136–145)
SP GR UR STRIP: 1.01 (ref 1–1.03)
TRIGL SERPL-MCNC: 141 MG/DL (ref 30–149)
UROBILINOGEN UR STRIP-ACNC: NORMAL
VLDLC SERPL CALC-MCNC: 22 MG/DL (ref 0–30)
WBC # BLD AUTO: 7 X10(3) UL (ref 4–11)

## 2025-05-01 PROCEDURE — 80061 LIPID PANEL: CPT

## 2025-05-01 PROCEDURE — 80053 COMPREHEN METABOLIC PANEL: CPT

## 2025-05-01 PROCEDURE — 83036 HEMOGLOBIN GLYCOSYLATED A1C: CPT

## 2025-05-01 PROCEDURE — 83970 ASSAY OF PARATHORMONE: CPT

## 2025-05-01 PROCEDURE — 36415 COLL VENOUS BLD VENIPUNCTURE: CPT

## 2025-05-01 PROCEDURE — 85027 COMPLETE CBC AUTOMATED: CPT

## 2025-05-01 PROCEDURE — 81003 URINALYSIS AUTO W/O SCOPE: CPT

## 2025-05-01 RX ORDER — ASPIRIN 81 MG/1
81 TABLET, COATED ORAL DAILY
COMMUNITY
Start: 2025-05-01

## 2025-05-01 RX ORDER — CHOLECALCIFEROL (VITAMIN D3) 50 MCG
2000 TABLET ORAL DAILY
COMMUNITY
Start: 2025-05-01

## 2025-05-01 NOTE — PROGRESS NOTES
Subjective:   Kalani Reyes is a 89 year old female who presents for a Medicare Subsequent Annual Wellness visit (Pt already had Initial Annual Wellness) and scheduled follow up of multiple significant but stable problems.   History of Present Illness  Kalani Reyes is an 89 year old female with hypertension and sleep apnea who presents for routine Medicare physical, and with fatigue and low energy.    She experiences significant fatigue and low energy, with a constant desire to sleep despite consistent use of her CPAP machine for sleep apnea. She sometimes struggles to fall asleep without the mask.     She has a history of hypertension and notes that her blood pressure readings at home are sometimes low, but when checked at [facility], they are high. She stopped taking baby aspirin about a month ago due to concerns about interactions with other medications. She is currently on medications including furosemide, carvedilol, amlodipine, and duloxetine.    She experiences occasional knee pain and shoulder discomfort, described as mild. She has undergone physical therapy in the past, which provided temporary relief, but symptoms returned after stopping therapy. She has difficulty maintaining an exercise routine due to fatigue and knee pain, although she tries to walk a little.    She has a history of anaphylaxis 5 months ago thought to be likely related to either naproxen or losarta, which was discontinued due to a severe allergy. She also experiences headaches and attributes some of her symptoms to side effects from her medications.    She has a history of chronic kidney disease stage 3A, hyperlipidemia, osteopenia, primary hyperparathyroidism, prediabetes, and Meniere's disease. She uses a hearing aid and takes medications including rosuvastatin, alendronate, turmeric, and betahistine. She also uses a cream for rashes and has a history of anaphylaxis, avoiding Aleve and valsartan.    Recently, she developed a rash on her  foot, diagnosed as tinea corporis, for which she was prescribed a cream that has improved the condition. She is unsure of the rash's origin but notes it has resolved with treatment.      History/Other:   Fall Risk Assessment:   She has been screened for Falls and is High Risk. Fall Prevention information provided to patient in After Visit Summary.    Do you feel unsteady when standing or walking?: No  Do you worry about falling?: Yes  Have you fallen in the past year?: Yes  How many times have you fallen?: 1  Were you injured?: No     Cognitive Assessment:   She had a completely normal cognitive assessment - see flowsheet entries     Functional Ability/Status:   Kalani Reyes has some abnormal functions as listed below:  She has Driving difficulties based on screening of functional status. She has difficulties Shopping for Groceries based on screening of functional status. She has Hearing problems based on screening of functional status.She has Vision problems based on screening of functional status. She has Walking problems based on screening of functional status.       Depression Screening (PHQ):  PHQ-2 SCORE: 0  , done 5/1/2025   Last [place] Suicide Screening on 5/1/2025 was No Risk.          Advanced Directives:   She does have a Living Will but we do NOT have it on file in Epic.    She does have a POA but we do NOT have it on file in [facility].    Patient has Advance Care Planning documents present in EMR. Reviewed documents with patient (and family/surrogate if present).      Patient Active Problem List   Diagnosis    Hypertension, benign    Mixed hyperlipidemia    Meniere's disease    Osteopenia    Prediabetes    Left bundle branch block (LBBB)    Presbycusis of both ears    Chronic kidney disease, stage 3a (HCC)    Major depressive disorder, recurrent, moderate (HCC)    Primary hyperparathyroidism (HCC)    Class 2 severe obesity due to excess calories with serious comorbidity and body mass index (BMI) of 38.0  to 38.9 in adult (HCC)    RUFINO (obstructive sleep apnea)    History of anaphylaxis     Allergies:  She is allergic to naproxen and valsartan-hydrochlorothiazide.    Current Medications:  Outpatient Medications Marked as Taking for the 5/1/25 encounter (Office Visit) with Shayla Pierre MD   Medication Sig    Cholecalciferol (VITAMIN D) 50 MCG (2000 UT) Oral Tab Take 2,000 Int'l Units by mouth daily.    aspirin 81 MG Oral Tab EC Take 1 tablet (81 mg total) by mouth daily.    clotrimazole-betamethasone 1-0.05 % External Cream Apply 1 Application topically 2 (two) times daily for 14 days.    carvedilol 12.5 MG Oral Tab Take 2 tablets (25 mg total) by mouth 2 (two) times daily with meals.    DULOXETINE 30 MG Oral Cap DR Particles TAKE 1 CAPSULE BY MOUTH EVERY DAY    rosuvastatin 5 MG Oral Tab Take 1 tablet (5 mg total) by mouth nightly.    furosemide 20 MG Oral Tab Take 1 tablet (20 mg total) by mouth daily.    amLODIPine 5 MG Oral Tab Take 1 tablet (5 mg total) by mouth daily.    Betahistine HCl (BETAHISTINE DIHYDROCHLORIDE) Does not apply Powder Take 16 mg by mouth in the morning, at noon, and at bedtime. Po TID per ENT 16 MG CAPSULE. Use [facility]    alendronate 70 MG Oral Tab Take 1 tablet (70 mg total) by mouth every 7 days.    Turmeric 400 MG Oral Cap Take 1 capsule by mouth 3 (three) times daily.       Medical History:  She  has a past medical history of DJD (degenerative joint disease) of knee, High blood pressure, High cholesterol, History of colonic polyps (10/23/2015), Obesity, unspecified, Osteoarthritis, Osteopenia (2002, 2006, 2012), Osteoporosis (1999), Primary osteoarthritis involving multiple joints (8/28/2015), and Unspecified essential hypertension.  Surgical History:  She  has no past surgical history on file.   Family History:  Her family history includes Other in her father and mother.  Social History:  She  reports that she has never smoked. She has been exposed to tobacco smoke. She has  never used smokeless tobacco. She reports that she does not drink alcohol and does not use drugs.    Tobacco:  She has never smoked tobacco.    CAGE Alcohol Screen:   CAGE screening score of 0 on 5/1/2025, showing low risk of alcohol abuse.      Patient Care Team:  Shayla Pierre MD as PCP - General (Family Medicine)    Review of Systems     Negative except see above    Objective:   Physical Exam  General Appearance:  Alert, cooperative, no distress, appears stated age   Head:  Normocephalic, without obvious abnormality, atraumatic   Eyes:  PERRL, conjunctiva/corneas clear, EOM's intact both eyes   Ears:  Normal TM's and external ear canals, both ears   Nose: Nares normal, septum midline,mucosa normal, no drainage or sinus tenderness   Throat: Lips, mucosa, and tongue normal, upper dentures.  Right lower gingival ridge healing from recent surgery.   Neck: Supple, symmetrical, trachea midline, no adenopathy;  thyroid: not enlarged, symmetric, no tenderness/mass/nodules; no carotid bruit or JVD   Back:   Symmetric, no curvature, ROM normal, no CVA tenderness   Lungs:   Clear to auscultation bilaterally, respirations unlabored   Heart:  Regular rate and rhythm, S1 and S2 normal, 1//6 systolic ejection murmur   Abdomen:   Soft, non-tender, bowel sounds active all four quadrants,  no masses, no organomegaly   Pelvic: Deferred   Extremities: Extremities normal, atraumatic, no cyanosis or edema   Pulses: 2+ and symmetric   Skin: Skin color, texture, turgor normal, pink macular rash with serpiginous border right ankle   Lymph nodes: Cervical, supraclavicular, and axillary nodes normal   Neurologic: Normal.  No focal deficits.  Antalgic gait.    and Breasts:  normal appearance, no masses or tenderness    /81   Pulse 65   Ht 4' 10.27\" (1.48 m)   Wt 182 lb (82.6 kg)   LMP  (LMP Unknown)   SpO2 (!) 6%   BMI 37.69 kg/m²  Estimated body mass index is 37.69 kg/m² as calculated from the following:    Height as of this  encounter: 4' 10.27\" (1.48 m).    Weight as of this encounter: 182 lb (82.6 kg).    Medicare Hearing Assessment:   Hearing Screening    Time taken: 5/1/2025 11:10 AM  Entry User: Michelle Monsalve  Screening Method: Finger Rub  Finger Rub Result: Pass         Visual Acuity:   Right Eye Visual Acuity: Corrected Right Eye Chart Acuity: 20/30   Left Eye Visual Acuity: Corrected Left Eye Chart Acuity: 20/30   Both Eyes Visual Acuity: Corrected Both Eyes Chart Acuity: 20/30   Able To Tolerate Visual Acuity: Yes        Assessment & Plan:   Kalani Reyes is a 89 year old female who presents for a Medicare Assessment.     1. Encounter for annual health examination (Primary)  2. Chronic kidney disease, stage 3a ([facility])  3. Major depressive disorder, recurrent, moderate ([facility])  4. Primary hyperparathyroidism ([facility])  Overview:  1/2024-evaluation with Dr. Graham.  Plan to monitor.  Orders:  -     PTH, Intact; Future; Expected date: 05/01/2025  5. Class 2 severe obesity due to excess calories with serious comorbidity and body mass index (BMI) of 38.0 to 38.9 in adult ([facility])-Last BMI: 37.69, Class 2 Obesity.  She was counseled on diet and exercise for elevated BMI which is affecting her hypertension, hyperlipidemia, sleep apnea, and osteoarthritis.   6. Hypertension, benign  7. Mixed hyperlipidemia  -     CBC, Platelet; No Differential; Future; Expected date: 05/01/2025  -     Comp Metabolic Panel (14); Future; Expected date: 05/01/2025  -     Lipid Panel; Future; Expected date: 05/01/2025  8. Left bundle branch block (LBBB)  Overview:  7/2016 EKG [facility]  9. Prediabetes  Overview:  7/2016  Orders:  -     Hemoglobin A1C; Future; Expected date: 05/01/2025  10. Osteopenia, unspecified location  Overview:  11/2017, Ca and vit D, repeat in 2-3 yr. 1/2023- osteopenia, slight increase fracture risk.  Start alendronate.  11. History of anaphylaxis  Overview:  12/2024-likely valsartan or naproxen.  EpiPen.  Allergist referral.  12.  Meniere's disease, unspecified laterality  13. Presbycusis of both ears  Overview:  Hearing aids    14. RUFINO (obstructive sleep apnea)  Overview:  Using CPAP  15. Other fatigue  -     Urinalysis with Culture Reflex; Future; Expected date: 05/01/2025    Assessment & Plan  Wellness Visit  Routine wellness visit for an 89-year-old female. Reports fatigue and lack of energy, possibly related to age and medication side effects. Discussed potential benefits of exercise at the [facility], including senior strength and shallow water exercise classes.  - No evident cause on exam today, check labs as ordered.  - Recommend [facility] for exercise, including senior strength and shallow water exercise classes  - Continue current medications: rosuvastatin, furosemide, carvedilol, amlodipine, duloxetine, alendronate, turmeric, betahistine  - Encourage dietary calcium intake  - Continue CPAP use for sleep apnea  - Continue baby aspirin    Hypertension  Hypertension management with current medications. Blood pressure readings at home are variable. Discussed potential side effects of medications contributing to fatigue, but choices for blood pressure control are limited for her-no ACE/ARB. Emphasized importance of blood pressure control to prevent complications. Considered medication adjustments but concerned about increasing fatigue with hydralazine or clonidine.  - Continue current antihypertensive regimen  - Monitor blood pressure at home  - Encourage exercise to help manage blood pressure    Chronic Kidney Disease, Stage 3A  Chronic kidney disease stage 3A. Emphasized importance of blood pressure control to protect kidney function.  - Continue blood pressure management  - Ensure adequate hydration    Hyperlipidemia  Hyperlipidemia managed with rosuvastatin.  - Continue rosuvastatin    Prediabetes  Prediabetes with ongoing monitoring. Emphasized importance of diet and exercise to prevent progression to diabetes.  - Monitor blood glucose  levels  - Encourage healthy diet and exercise    Sleep Apnea  Sleep apnea managed with CPAP. Reports using the mask regularly.  - Continue CPAP use    Depression  Depression managed with duloxetine.  - Continue duloxetine    Primary Hyperparathyroidism  Primary hyperparathyroidism with monitoring of calcium levels.  - Monitor calcium levels    Osteopenia  Osteopenia managed with alendronate and dietary calcium.  - Continue alendronate  - Encourage dietary calcium intake    Left Bundle Branch Block  Left bundle branch block noted. No specific treatment required at this time.    Meniere's Disease  Meniere's disease managed with betahistine.  - Continue betahistine    Anaphylaxis  Anaphylaxis to Aleve and valsartan. Avoidance of known allergens emphasized.  - Avoid Aleve and valsartan    General Health Maintenance  Discussed importance of exercise and maintaining a healthy lifestyle.  - Encourage exercise at [facility]  - Encourage healthy diet    Goals of Care  Discussed future living arrangements and the importance of planning for potential changes in living situation.  - Consider visiting potential living facilities to explore options    Follow-up  Follow-up planned to monitor health status and response to current management strategies.  - Follow up in 4 months  - Order blood tests including CBC, kidney function, blood glucose, and cholesterol  - Obtain urine sample to check for kidney infection    The patient indicates understanding of these issues and agrees to the plan.  Lab work ordered.  Reinforced healthy diet, lifestyle, and exercise.      Return in about 4 months (around 9/1/2025) for hypertension.     Shayla Pierre MD, 5/1/2025     Supplementary Documentation:   General Health:  In the past six months, have you lost more than 10 pounds without trying?: 2 - No  Has your appetite been poor?: No  Type of Diet: Balanced, Low Salt, Low Carb  How does the patient maintain a good energy level?: Daily Walks,  Stretching  How would you describe your daily physical activity?: Moderate  How would you describe your current health state?: Fair  How do you maintain positive mental well-being?: Visiting Friends, Visiting Family  On a scale of 0 to 10, with 0 being no pain and 10 being severe pain, what is your pain level?: 1 - (Mild)  In the past six months, have you experienced urine leakage?: 0-No  At any time do you feel concerned for the safety/well-being of yourself and/or your children, in your home or elsewhere?: No  Have you had any immunizations at another office such as Influenza, Hepatitis B, Tetanus, or Pneumococcal?: No    Health Maintenance   Topic Date Due    COVID-19 Vaccine (5 - 2024-25 season) 09/01/2024    Annual Physical  04/30/2025    HTN: BP Follow-Up  06/01/2025    Influenza Vaccine  Completed    Annual Depression Screening  Completed    Fall Risk Screening (Annual)  Completed    Pneumococcal Vaccine: 50+ Years  Completed    Zoster Vaccines  Completed    Meningococcal B Vaccine  Aged Out

## 2025-05-05 ENCOUNTER — TELEPHONE (OUTPATIENT)
Dept: FAMILY MEDICINE CLINIC | Facility: CLINIC | Age: 89
End: 2025-05-05

## 2025-05-05 NOTE — TELEPHONE ENCOUNTER
Patient states her blood pressure today was 122/67 and heart rate was 65. Advised to check blood pressure twice a day (morning/evening). She verbalized understanding.     Routing as FYROSALINE.

## 2025-07-14 RX ORDER — CLOTRIMAZOLE AND BETAMETHASONE DIPROPIONATE 10; .64 MG/G; MG/G
1 CREAM TOPICAL 2 TIMES DAILY
Qty: 45 G | Refills: 1 | Status: SHIPPED | OUTPATIENT
Start: 2025-07-14 | End: 2025-07-28

## 2025-07-14 NOTE — TELEPHONE ENCOUNTER
Pharmacy calling to request for Chlotrimazole/betamethasone cream.  Previously prescribed from Urgent Care.  Medication pended and routed to Refill team.

## 2025-07-14 NOTE — TELEPHONE ENCOUNTER
Spoke with patient. States she was treated for ringworm by immediate care in April.  Rash has recurred, localized redness and itching to her leg. Denies other symptoms.  Requests refill, pended for signature.

## 2025-08-04 RX ORDER — CARVEDILOL 12.5 MG/1
25 TABLET ORAL 2 TIMES DAILY WITH MEALS
Qty: 180 TABLET | Refills: 3 | Status: SHIPPED | OUTPATIENT
Start: 2025-08-04

## 2025-08-19 RX ORDER — FUROSEMIDE 20 MG/1
20 TABLET ORAL DAILY
Qty: 90 TABLET | Refills: 3 | Status: SHIPPED | OUTPATIENT
Start: 2025-08-19

## (undated) NOTE — LETTER
AUTHORIZATION FOR SURGICAL OPERATION OR OTHER PROCEDURE    1. I hereby authorize Dr. Delmis Perry , and [facility] staff assigned to my case to perform the following operation and/or procedure at the [facility], [facility]:    Left Knee injection ______________________________________________________________________________________________      _______________________________________________________________________________________________    2. My physician has explained the nature and purpose of the operation or other procedure, possible alternative methods of treatment, the risks involved, and the possibility of complication to me. I acknowledge that no guarantee has been made as to the result that may be obtained. 3.  I recognize that, during the course of this operation, or other procedure, unforseen conditions may necessitate additional or different procedure than those listed above. I, therefore, further authorize and request that the above named physician, his/her physician assistants or designees perform such procedures as are, in his/her professional opinion, necessary and desirable. 4.  Any tissue or organs removed in the operation or other procedure may be disposed of by and at the discretion of the [facility] and [facility]. 5.  I understand that in the event of a medical emergency, I will be transported by local paramedics to [facility] or other [facility] emergency department. 6.  I certify that I have read and fully understand the above consent to operation and/or other procedure. 7.  I acknowledge that my physician has explained sedation/analgesia administration to me including the risks and benefits. I consent to the administration of sedation/analgesia as may be necessary or desirable in the judgement of my physician.     Witness signature: ___________________________________________________ Date:  ______/______/_____                    Time: ________ A. M.  P.M. Patient Name:  ______________________________________________________  (please print)      Patient signature:  ___________________________________________________             Relationship to Patient:           []  Parent    Responsible person                          []  Spouse  In case of minor or                    [] Other  _____________   Incompetent name:  __________________________________________________                               (please print)      _____________      Responsible person  In case of minor or  Incompetent signature:  _______________________________________________    Statement of Physician  My signature below affirms that prior to the time of the procedure, I have explained to the patient and/or his/her guardian, the risks and benefits involved in the proposed treatment and any reasonable alternative to the proposed treatment. I have also explained the risks and benefits involved in the refusal of the proposed treatment and have answered the patient's questions.                         Date:  ______/______/_______  Provider                      Signature:  __________________________________________________________       Time:  ___________ A.M    P.M.

## (undated) NOTE — Clinical Note
3/2/2017              [place]        [address] [id number]         Dear Myriam Quan,    It was a pleasure to see you. Your mammogram was normal.  There is no need for further testing at this time.   I look forward to seeing you at your ne

## (undated) NOTE — Clinical Note
Thank you for the consult. I saw Ms. Reyes in  the endocrine/diabetes clinic today. Please see attached my note. Please feel free to contact me with any questions. Thanks!

## (undated) NOTE — MR AVS SNAPSHOT
[facility]  [address], [address] [place] [address]  [phone]               Thank you for choosing us for your health care visit with Jose Antonio Monteiro.  Nimisha Ortez MD.  We are glad to serve you and happy to provide you with this summary TAKE 1 TABLET NIGHTLY.    Commonly known as:  ZOCOR           TraMADol HCl 50 MG Tabs   TAKE 1 TABLET THREE TIMES DAILY AS NEEDED   Commonly known as:  ULTRAM           Valsartan-Hydrochlorothiazide 160-12.5 MG Tabs   TAKE 1 TABLET EVERY DAY